# Patient Record
Sex: FEMALE | Race: WHITE | NOT HISPANIC OR LATINO | Employment: FULL TIME | URBAN - METROPOLITAN AREA
[De-identification: names, ages, dates, MRNs, and addresses within clinical notes are randomized per-mention and may not be internally consistent; named-entity substitution may affect disease eponyms.]

---

## 2017-10-11 ENCOUNTER — GENERIC CONVERSION - ENCOUNTER (OUTPATIENT)
Dept: OTHER | Facility: OTHER | Age: 42
End: 2017-10-11

## 2017-10-24 ENCOUNTER — GENERIC CONVERSION - ENCOUNTER (OUTPATIENT)
Dept: OTHER | Facility: OTHER | Age: 42
End: 2017-10-24

## 2017-10-24 DIAGNOSIS — K58.1 IRRITABLE BOWEL SYNDROME WITH CONSTIPATION: ICD-10-CM

## 2017-10-24 DIAGNOSIS — K59.09 OTHER CONSTIPATION: ICD-10-CM

## 2017-11-06 ENCOUNTER — HOSPITAL ENCOUNTER (EMERGENCY)
Facility: HOSPITAL | Age: 42
Discharge: HOME | End: 2017-11-07
Attending: EMERGENCY MEDICINE | Admitting: EMERGENCY MEDICINE
Payer: COMMERCIAL

## 2017-11-06 DIAGNOSIS — S06.0X9A CONCUSSION: ICD-10-CM

## 2017-11-06 DIAGNOSIS — M54.2 NECK PAIN: ICD-10-CM

## 2017-11-06 DIAGNOSIS — S09.90XA MINOR HEAD INJURY, INITIAL ENCOUNTER: Primary | ICD-10-CM

## 2017-11-07 ENCOUNTER — APPOINTMENT (EMERGENCY)
Dept: CT IMAGING | Facility: HOSPITAL | Age: 42
End: 2017-11-07
Payer: COMMERCIAL

## 2017-11-07 VITALS
DIASTOLIC BLOOD PRESSURE: 75 MMHG | HEART RATE: 65 BPM | TEMPERATURE: 97.8 F | WEIGHT: 145 LBS | BODY MASS INDEX: 28.47 KG/M2 | OXYGEN SATURATION: 100 % | HEIGHT: 60 IN | SYSTOLIC BLOOD PRESSURE: 117 MMHG | RESPIRATION RATE: 16 BRPM

## 2017-11-07 PROCEDURE — 72125 CT NECK SPINE W/O DYE: CPT

## 2017-11-07 PROCEDURE — 99284 EMERGENCY DEPT VISIT MOD MDM: CPT

## 2017-11-07 PROCEDURE — 70450 CT HEAD/BRAIN W/O DYE: CPT

## 2017-11-07 RX ORDER — NAPROXEN 250 MG/1
500 TABLET ORAL ONCE
Status: DISCONTINUED | OUTPATIENT
Start: 2017-11-07 | End: 2017-11-07 | Stop reason: HOSPADM

## 2017-11-07 RX ORDER — CYCLOBENZAPRINE HCL 5 MG
5 TABLET ORAL 3 TIMES DAILY PRN
Qty: 21 TABLET | Refills: 0 | Status: SHIPPED | OUTPATIENT
Start: 2017-11-07 | End: 2019-06-22 | Stop reason: ALTCHOICE

## 2017-11-07 RX ORDER — NAPROXEN 500 MG/1
500 TABLET ORAL 2 TIMES DAILY WITH MEALS
Qty: 20 TABLET | Refills: 0 | Status: SHIPPED | OUTPATIENT
Start: 2017-11-07 | End: 2019-06-22 | Stop reason: ALTCHOICE

## 2017-11-07 NOTE — DISCHARGE INSTRUCTIONS
Please return if you developed worsening or other concerning symptoms otherwise follow up as instructed with Neurology for re-evaluation as instructed       Acute Neck Pain   WHAT YOU NEED TO KNOW:   Acute neck pain starts suddenly, increases quickly, and goes away in a few days  The pain may come and go, or be worse with certain movements  The pain may be only in your neck, or it may move to your arms, back, or shoulders  You may also have pain that starts in another body area and moves to your neck  DISCHARGE INSTRUCTIONS:   Return to the emergency department if:   · You have an injury that causes neck pain and shooting pain down your arms or legs  · Your neck pain suddenly becomes severe  · You have neck pain along with numbness, tingling, or weakness in your arms or legs  · You have a stiff neck, a headache, and a fever  Contact your healthcare provider if:   · You have new or worsening symptoms  · Your symptoms continue even after treatment  · You have questions or concerns about your condition or care  Medicines:   · NSAIDs , such as ibuprofen, help decrease swelling, pain, and fever  This medicine is available without a doctor's order  Ask your healthcare provider which medicine to take and how often to take it  Follow directions  NSAIDs can cause stomach bleeding or kidney problems if not taken correctly  If you take blood thinner medicine, always ask if NSAIDs are safe for you  · Acetaminophen  helps decrease pain and fever  Ask your healthcare provider how much to take and how often to take it  Follow directions  Acetaminophen can cause liver damage if not taken correctly  · Steroid medicine  may be used to reduce inflammation  This can help relieve pain caused by swelling  · Take your medicine as directed  Contact your healthcare provider if you think your medicine is not helping or if you have side effects  Tell him or her if you are allergic to any medicine   Keep a list of the medicines, vitamins, and herbs you take  Include the amounts, and when and why you take them  Bring the list or the pill bottles to follow-up visits  Carry your medicine list with you in case of an emergency  Manage or prevent acute neck pain:   · Rest your neck as directed  Do not make sudden movements, such as turning your head quickly  Your healthcare provider may recommend you wear a cervical collar for a short time  The collar will prevent you from moving your head  This will give your neck time to heal if an injury is causing your neck pain  Ask your healthcare provider when you can return to sports or other normal daily activities  · Apply heat as directed  Heat helps relieve pain and swelling  Use a heat wrap, or soak a small towel in warm water  Wring out the extra water  Apply the heat wrap or towel for 20 minutes every hour, or as directed  · Apply ice as directed  Ice helps relieve pain and swelling, and can help prevent tissue damage  Use an ice pack, or put ice in a bag  Cover the ice pack or back with a towel before you apply it to your neck  Apply the ice pack or ice for 15 minutes every hour, or as directed  Your healthcare provider can tell you how often to apply ice  · Do neck exercises as directed  Neck exercises help strengthen the muscles and increase range of motion  Your healthcare provider will tell you which exercises are right for you  He may give you instructions, or he may recommend that you work with a physical therapist  Your healthcare provider or therapist can make sure you are doing the exercises correctly  · Maintain good posture  Try to keep your head and shoulders lifted when you sit  If you work in front of a computer, make sure the monitor is at the right level  You should not need to look up down to see the screen  You should also not have to lean forward to be able to read what is on the screen   Make sure your keyboard, mouse, and other computer items are placed where you do not have to extend your shoulder to reach them  Get up often if you work in front of a computer or sit for long periods of time  Stretch or walk around to keep your neck muscles loose  Follow up with your healthcare provider as directed: Your healthcare provider may refer you to a specialist if your pain does not get better with treatment  Write down your questions so you remember to ask them during your visits  © 2017 [employer] Information is for End User's use only and may not be sold, redistributed or otherwise used for commercial purposes  All illustrations and images included in CareNotes® are the copyrighted property of [employer]  or [address]  The above information is an  only  It is not intended as medical advice for individual conditions or treatments  Talk to your doctor, nurse or pharmacist before following any medical regimen to see if it is safe and effective for you  Concussion   WHAT YOU NEED TO KNOW:   A concussion is a mild brain injury  It is usually caused by a bump or blow to the head from a fall, a motor vehicle crash, or a sports injury  Sometimes being shaken forcefully may cause a concussion  DISCHARGE INSTRUCTIONS:   Have someone else call 911 for the following:   Someone tries to wake you and cannot do so  You have a seizure, increasing confusion, or a change in personality  Your speech becomes slurred, or you have new vision problems  Return to the emergency department if:   You have a severe headache that does not go away  You have arm or leg weakness, numbness, or new problems with coordination  You have blood or clear fluid coming out of the ears or nose  Contact your healthcare provider if:   You have nausea or are vomiting  You feel more sleepy than usual     Your symptoms get worse  Your symptoms last longer than 6 weeks after the injury      You have questions or concerns about your condition or care  Medicines:   Acetaminophen  helps to decrease pain  It is available without a doctor's order  Ask how much to take and how often to take it  Follow directions  Acetaminophen can cause liver damage if not taken correctly  NSAIDs , such as ibuprofen, help decrease swelling and pain  NSAIDs can cause stomach bleeding or kidney problems in certain people  If you take blood thinner medicine, always ask your healthcare provider if NSAIDs are safe for you  Always read the medicine label and follow directions  Take your medicine as directed  Contact your healthcare provider if you think your medicine is not helping or if you have side effects  Tell him or her if you are allergic to any medicine  Keep a list of the medicines, vitamins, and herbs you take  Include the amounts, and when and why you take them  Bring the list or the pill bottles to follow-up visits  Carry your medicine list with you in case of an emergency  Follow up with your healthcare provider as directed:  Write down your questions so you remember to ask them during your visits  Self-care:   Rest  from physical and mental activities as directed  Mental activities are those that require thinking, concentration, and attention  You will need to rest until your symptoms are gone  Rest will allow you to recover from your concussion  Ask your healthcare provider when you can return to work and other daily activities  Have someone stay with you for the first 24 hours after your injury  Your healthcare provider should be contacted if your symptoms get worse, or you develop new symptoms  Do not participate in sports and physical activities until your healthcare provider says it is okay  They could make your symptoms worse or lead to another concussion  Your healthcare provider will tell you when it is okay for you to return to sports or physical activities    Prevent another concussion:   Wear protective sports equipment that fit properly  Helmets help decrease your risk of a serious brain injury  Talk to your healthcare provider about ways you can decrease your risk for a concussion if you play sports  Wear your seat belt  every time you travel  This helps to decrease your risk of a head injury if you are in a car accident  © 2017 [address] [employer] Information is for End User's use only and may not be sold, redistributed or otherwise used for commercial purposes  All illustrations and images included in CareNotes® are the copyrighted property of [employer]  or [employer]  The above information is an  only  It is not intended as medical advice for individual conditions or treatments  Talk to your doctor, nurse or pharmacist before following any medical regimen to see if it is safe and effective for you

## 2017-11-07 NOTE — ED PROVIDER NOTES
History  Chief Complaint   Patient presents with   [facility] Theresa Fall     Per pt - fall on Saturday, pt struck back on head on concrete after roller skating  Pt denies LOC, reports vision changes, disorientation, and headache     44-year-old female without past medical history presenting with chief complaint of head injury  Patient reports that on Friday she was at a skating rink she was struck by another skater by accident and she had legs taken out from underneath her and she struck the back of her head without catching herself she states there is a 20 minutes marleen of where she does not remember at all feels blurry although she remembers that she was able to get up off the ground after several minutes and go to an area where she sat for approximately 20 minutes with a severe posterior headache, her headache is still described as posterior radiates frontal no exacerbating or remitting factors over the last couple days she has noticed that she is dull she has intermittent blurry vision she does not feel sharp as she usually does, she feels forgetful and has difficulty with her concentration she also notes posterior neck pain she was seen at a pharmacy and directed to go the emergency department for evaluation of head injury and possible neck injury  Patient otherwise denies other auditory visual or balance complaint denies anterior neck pain weakness paresthesias or anesthesia nausea vomiting chest pain cough shortness of breath anorexia abdominal pain other muscle skeletal complaint or injury  Complete review systems otherwise negative  Patient is status post hysterectomy            None       Past Medical History:   Diagnosis Date    SVT (supraventricular tachycardia) ([facility] [address] )        Past Surgical History:   Procedure Laterality Date    HYSTERECTOMY         History reviewed  No pertinent family history  I have reviewed and agree with the history as documented      Social History   Substance Use Topics    Smoking status: Never Smoker    Smokeless tobacco: Never Used    Alcohol use No        Review of Systems   Constitutional: Negative for chills and fever  HENT: Negative for rhinorrhea and sore throat  Eyes: Positive for visual disturbance  Negative for photophobia, pain and redness  Respiratory: Negative for cough and shortness of breath  Cardiovascular: Negative for chest pain and palpitations  Gastrointestinal: Negative for abdominal pain, diarrhea, nausea and vomiting  Genitourinary: Negative for dysuria, frequency and urgency  Musculoskeletal: Positive for neck pain and neck stiffness  Negative for arthralgias, back pain and myalgias  Skin: Negative for color change and rash  Neurological: Positive for headaches  Negative for dizziness, facial asymmetry, weakness and numbness  Hematological: Negative for adenopathy  Does not bruise/bleed easily  Psychiatric/Behavioral: Negative for agitation and behavioral problems  All other systems reviewed and are negative  Physical Exam  ED Triage Vitals   Temperature Pulse Respirations Blood Pressure SpO2   11/06/17 2152 11/06/17 2304 11/06/17 2152 11/06/17 2152 11/06/17 2152   97 8 °F (36 6 °C) 79 16 134/79 100 %      Temp Source Heart Rate Source Patient Position - Orthostatic VS BP Location FiO2 (%)   11/06/17 2152 11/06/17 2152 11/06/17 2152 11/06/17 2152 --   Oral Monitor Sitting Left arm       Pain Score       11/06/17 2304       1           Orthostatic Vital Signs  Vitals:    11/06/17 2152 11/06/17 2304 11/07/17 0111   BP: 134/79 138/79 117/75   Pulse:  79 65   Patient Position - Orthostatic VS: Sitting Sitting Sitting       Physical Exam   Constitutional: She is oriented to person, place, and time  She appears well-developed and well-nourished  No distress  Well-appearing conversational no acute distress   HENT:   Head: Normocephalic and atraumatic     Right Ear: External ear normal    Left Ear: External ear normal    Mouth/Throat: Oropharynx is clear and moist    Patient has some mild tenderness over posterior scalp although her head appears atraumatic   Eyes: EOM are normal  Pupils are equal, round, and reactive to light  Pupils are 3 mm equal reactive bilaterally extraocular motions are intact without pain there is no chemosis conjunctival injection there are no other acute findings on eye exam   Neck: Normal range of motion  Neck supple  No tracheal deviation present  Cardiovascular: Normal rate, regular rhythm and normal heart sounds  Exam reveals no gallop and no friction rub  No murmur heard  Pulmonary/Chest: Effort normal and breath sounds normal  She has no wheezes  She has no rales  Abdominal: Soft  Bowel sounds are normal  She exhibits no distension  There is no tenderness  There is no rebound and no guarding  Musculoskeletal: Normal range of motion  She exhibits no edema or tenderness  Patient is 5/5 strength in the upper extremities lower extremities globally sensation is intact cap refills less than 2 seconds   Neurological: She is alert and oriented to person, place, and time  No cranial nerve deficit  She exhibits normal muscle tone  Coordination normal    Patient was observed having normal gait she has finger to nose intact pronator drift intact dysdiadochokinesis intact visual fields are grossly intact vision is grossly intact cranial nerves are intact muscle strength is 5/5 globally she does have mild midline upper cervical tenderness again her head appears atraumatic no other injuries noted to the head neck or face   Skin: Skin is warm and dry  No rash noted  Psychiatric: She has a normal mood and affect  Her behavior is normal    Nursing note and vitals reviewed        ED Medications  Medications - No data to display    Diagnostic Studies  Results Reviewed     None                 RADIOLOGY RESULTS   Final Result by  (11/07 1130)      CT spine cervical without contrast   ED Interpretation by Spring Serrano DO (11/07 200)   Vrad- no acute abnormality      Final Result by Kody Weaver MD (11/07 0406)      No cervical spine fracture or prevertebral soft tissue swelling  Straightening of the normal lordosis  Mild multilevel degenerative disease  Findings are consistent with the preliminary report from [facility] which was provided shortly after completion of the exam                       Workstation performed: [id number]         CT head without contrast   ED Interpretation by Boy Springer DO (11/07 0046)   Vrad- no acute abnormality      Final Result by Kody Weaver MD (11/07 0359)      No intracranial hemorrhage or calvarial fracture        Findings are consistent with the preliminary report from [facility] which was provided shortly after completion of the exam                       Workstation performed: [id number]                    Procedures  Procedures       Phone Contacts  ED Phone Contact    ED Course  ED Course as of Nov 07 1626 Tue Nov 07, 2017   0105 Patient seen in the Cut off waited, her CT of her head and neck were normal will discharge with Neurology follow-up versus Sports Medicine follow-up, agreeable to plan comma close return instructions                                MDM  Number of Diagnoses or Management Options  Concussion:   Minor head injury, initial encounter:   Neck pain:   Diagnosis management comments: 46-year-old female without past medical history presenting several days after which she reports to be severe head injury where she had her feet taken out from underneath her, unsure of loss of consciousness but felt days for least 20 minutes with severe posterior headache also had neck pain, since that time she is felt foggy, she has intermittent blurry vision difficulty concentrating ongoing posterior head discomfort posterior neck pain without other neurologic complaints, on exam she is afebrile normal vital signs she is clinically very well-appearing conversational or head appears atraumatic that she does have some mild midline cervical tenderness without step-offs or deformity the upper lower extremities are neurovascularly intact, she is otherwise neurologically intact including eye exam, this most likely represents post concussive symptoms is not consistent with eye pathology or central cerebral vascular pathology including vertebral or carotid dissection, however patient expressed concern about possible intracranial hemorrhage, and was sent in by pharmacy for evaluation, will get CT scan of the head and neck if she develop severe headache with some vague neurologic symptoms, unknown loss of consciousness, CT scan of her neck with midline posterior tenderness, if negative will treat symptomatically with close return in follow-up instructions, expected management, neurology follow-up    CritCare Time    Disposition  Final diagnoses:   Minor head injury, initial encounter   Concussion   Neck pain     Time reflects when diagnosis was documented in both MDM as applicable and the Disposition within this note     Time User Action Codes Description Comment    11/7/2017  1:05 AM Keo Peasant Add [S00 90XA] Minor head injury, initial encounter     11/7/2017  1:06 AM Keo Peasant Add [S06 0X9A] Concussion     11/7/2017  1:06 AM Keo Peasant Add [M54 2] Neck pain       ED Disposition     ED Disposition Condition Comment    Discharge  Ofelia Blanchard discharge to home/self care      Condition at discharge: Good        Follow-up Information     Follow up With Specialties Details Why Contact Info Additional Information    [facility] Emergency Department Emergency Medicine  If symptoms worsen [address] [facility] [place] [id number] ED, [address] [place], [place], [address]    [facility] In 1 week  [address] [facility] [address] [phone] Discharge Medication List as of 11/7/2017  1:08 AM      START taking these medications    Details   cyclobenzaprine (FLEXERIL) 5 mg tablet Take 1 tablet by mouth 3 (three) times a day as needed for muscle spasms for up to 7 days, Starting Tue 11/7/2017, Until Tue 11/14/2017, Print      naproxen (NAPROSYN) 500 mg tablet Take 1 tablet by mouth 2 (two) times a day with meals for 10 days, Starting Tue 11/7/2017, Until Fri 11/17/2017, Print           No discharge procedures on file      ED Provider  Electronically Signed by           Júnior Garza DO  11/07/17 3241

## 2018-01-22 VITALS
SYSTOLIC BLOOD PRESSURE: 130 MMHG | DIASTOLIC BLOOD PRESSURE: 80 MMHG | HEIGHT: 61 IN | BODY MASS INDEX: 28.89 KG/M2 | WEIGHT: 153 LBS

## 2018-01-22 VITALS
BODY MASS INDEX: 27.38 KG/M2 | DIASTOLIC BLOOD PRESSURE: 70 MMHG | HEIGHT: 61 IN | SYSTOLIC BLOOD PRESSURE: 116 MMHG | WEIGHT: 145 LBS

## 2019-05-02 ENCOUNTER — TELEPHONE (OUTPATIENT)
Dept: GASTROENTEROLOGY | Facility: CLINIC | Age: 44
End: 2019-05-02

## 2019-05-02 PROBLEM — Z86.0100 HISTORY OF COLON POLYPS: Status: ACTIVE | Noted: 2019-05-02

## 2019-05-02 PROBLEM — Z86.010 HISTORY OF COLON POLYPS: Status: ACTIVE | Noted: 2019-05-02

## 2019-06-22 ENCOUNTER — HOSPITAL ENCOUNTER (OUTPATIENT)
Dept: GASTROENTEROLOGY | Facility: HOSPITAL | Age: 44
Setting detail: OUTPATIENT SURGERY
Discharge: HOME | End: 2019-06-22
Attending: INTERNAL MEDICINE
Payer: COMMERCIAL

## 2019-06-22 ENCOUNTER — ANESTHESIA (OUTPATIENT)
Dept: GASTROENTEROLOGY | Facility: HOSPITAL | Age: 44
End: 2019-06-22

## 2019-06-22 ENCOUNTER — ANESTHESIA EVENT (OUTPATIENT)
Dept: GASTROENTEROLOGY | Facility: HOSPITAL | Age: 44
End: 2019-06-22

## 2019-06-22 VITALS
TEMPERATURE: 98.3 F | DIASTOLIC BLOOD PRESSURE: 67 MMHG | BODY MASS INDEX: 26.22 KG/M2 | SYSTOLIC BLOOD PRESSURE: 102 MMHG | WEIGHT: 138.89 LBS | OXYGEN SATURATION: 100 % | RESPIRATION RATE: 18 BRPM | HEART RATE: 67 BPM | HEIGHT: 61 IN

## 2019-06-22 DIAGNOSIS — Z86.010 HISTORY OF COLONIC POLYPS: ICD-10-CM

## 2019-06-22 PROCEDURE — 88305 TISSUE EXAM BY PATHOLOGIST: CPT | Performed by: PATHOLOGY

## 2019-06-22 PROCEDURE — 45384 COLONOSCOPY W/LESION REMOVAL: CPT | Performed by: INTERNAL MEDICINE

## 2019-06-22 RX ORDER — PROPOFOL 10 MG/ML
INJECTION, EMULSION INTRAVENOUS AS NEEDED
Status: DISCONTINUED | OUTPATIENT
Start: 2019-06-22 | End: 2019-06-22 | Stop reason: SURG

## 2019-06-22 RX ORDER — LORATADINE 10 MG/1
10 TABLET ORAL DAILY
COMMUNITY

## 2019-06-22 RX ORDER — SODIUM CHLORIDE, SODIUM LACTATE, POTASSIUM CHLORIDE, CALCIUM CHLORIDE 600; 310; 30; 20 MG/100ML; MG/100ML; MG/100ML; MG/100ML
125 INJECTION, SOLUTION INTRAVENOUS CONTINUOUS
Status: DISCONTINUED | OUTPATIENT
Start: 2019-06-22 | End: 2019-06-26 | Stop reason: HOSPADM

## 2019-06-22 RX ORDER — LIDOCAINE HYDROCHLORIDE 10 MG/ML
INJECTION, SOLUTION INFILTRATION; PERINEURAL AS NEEDED
Status: DISCONTINUED | OUTPATIENT
Start: 2019-06-22 | End: 2019-06-22 | Stop reason: SURG

## 2019-06-22 RX ORDER — DULOXETIN HYDROCHLORIDE 20 MG/1
20 CAPSULE, DELAYED RELEASE ORAL DAILY
COMMUNITY

## 2019-06-22 RX ADMIN — PROPOFOL 50 MG: 10 INJECTION, EMULSION INTRAVENOUS at 09:44

## 2019-06-22 RX ADMIN — SODIUM CHLORIDE, SODIUM LACTATE, POTASSIUM CHLORIDE, AND CALCIUM CHLORIDE 125 ML/HR: .6; .31; .03; .02 INJECTION, SOLUTION INTRAVENOUS at 09:05

## 2019-06-22 RX ADMIN — LIDOCAINE HYDROCHLORIDE 50 MG: 10 INJECTION, SOLUTION INFILTRATION; PERINEURAL at 09:39

## 2019-06-22 RX ADMIN — PROPOFOL 50 MG: 10 INJECTION, EMULSION INTRAVENOUS at 09:48

## 2019-06-22 RX ADMIN — PROPOFOL 150 MG: 10 INJECTION, EMULSION INTRAVENOUS at 09:39

## 2021-01-09 ENCOUNTER — NURSE TRIAGE (OUTPATIENT)
Dept: OTHER | Facility: OTHER | Age: 46
End: 2021-01-09

## 2021-01-09 DIAGNOSIS — R05.9 COUGH: Primary | ICD-10-CM

## 2021-01-10 DIAGNOSIS — R05.9 COUGH: ICD-10-CM

## 2021-01-10 PROCEDURE — U0003 INFECTIOUS AGENT DETECTION BY NUCLEIC ACID (DNA OR RNA); SEVERE ACUTE RESPIRATORY SYNDROME CORONAVIRUS 2 (SARS-COV-2) (CORONAVIRUS DISEASE [COVID-19]), AMPLIFIED PROBE TECHNIQUE, MAKING USE OF HIGH THROUGHPUT TECHNOLOGIES AS DESCRIBED BY CMS-2020-01-R: HCPCS | Performed by: FAMILY MEDICINE

## 2021-01-10 PROCEDURE — U0005 INFEC AGEN DETEC AMPLI PROBE: HCPCS | Performed by: FAMILY MEDICINE

## 2021-01-10 NOTE — TELEPHONE ENCOUNTER
Regarding: COVID/ Symptomatic/ 2 of 2  ----- Message from K121 sent at 1/9/2021 12:51 PM EST -----  Pt states: ""I am calling on behalf of my son and I  My  was admitted to the hospital and while we are still waiting for his results, we are exhibiting symptoms  I have a sinus headache, runny nose, and a post nasal drip feeling  My son has a runny nose, fever, body aches, sore throat, no taste, and a bad headache   We are looking to get tested "

## 2021-01-10 NOTE — TELEPHONE ENCOUNTER
Reason for Disposition   [1] COVID-19 infection suspected by caller or triager AND [2] mild symptoms (cough, fever, or others) AND [5] no complications or SOB    Answer Assessment - Initial Assessment Questions  1  COVID-19 DIAGNOSIS: "Who made your Coronavirus (COVID-19) diagnosis?" "Was it confirmed by a positive lab test?" If not diagnosed by a HCP, ask "Are there lots of cases (community spread) where you live?" (See public health department website, if unsure)      n/a  2  COVID-19 EXPOSURE: "Was there any known exposure to [place] before the symptoms began?" [facility] Definition of close contact: within 6 feet (2 meters) for a total of 15 minutes or more over a 24-hour period  Yes,  has Covid with pneumonia  3  ONSET: "When did the COVID-19 symptoms start?"       Started 2 days ago  4  WORST SYMPTOM: "What is your worst symptom?" (e g , cough, fever, shortness of breath, muscle aches)      Post nasal drip/cough  5  COUGH: "Do you have a cough?" If so, ask: "How bad is the cough?"        Slight cough  6  FEVER: "Do you have a fever?" If so, ask: "What is your temperature, how was it measured, and when did it start?"      No fever  7  RESPIRATORY STATUS: "Describe your breathing?" (e g , shortness of breath, wheezing, unable to speak)       No problems  8  BETTER-SAME-WORSE: "Are you getting better, staying the same or getting worse compared to yesterday?"  If getting worse, ask, "In what way?"      Getting worse  9  HIGH RISK DISEASE: "Do you have any chronic medical problems?" (e g , asthma, heart or lung disease, weak immune system, obesity, etc )      no  10  PREGNANCY: "Is there any chance you are pregnant?" "When was your last menstrual period?"       Had partial hysterectomy  11   OTHER SYMPTOMS: "Do you have any other symptoms?"  (e g , chills, fatigue, headache, loss of smell or taste, muscle pain, sore throat; new loss of smell or taste especially support the diagnosis of COVID-19) Headache, runny nose    Protocols used: CORONAVIRUS (COVID-19) DIAGNOSED OR SUSPECTED-ADULT-

## 2021-01-10 NOTE — TELEPHONE ENCOUNTER
Pt has cough, runny nose and states  has Covid and pneumonia  Order placed, pt will go to [facility] in [address] tomorrow for swab  Advice offered per protocol

## 2021-01-12 ENCOUNTER — TELEPHONE (OUTPATIENT)
Dept: BARIATRICS | Facility: CLINIC | Age: 46
End: 2021-01-12

## 2021-01-12 LAB — SARS-COV-2 RNA SPEC QL NAA+PROBE: DETECTED

## 2021-01-12 NOTE — TELEPHONE ENCOUNTER
Pt made aware Your test for the novel coronavirus, also known as COVID-19, was positive  The sample showed that the virus was present  Positive COVID-19 test results are reportable to the [place] Department of Health  You may receive a call from trained public health staff to conduct an interview  It is important to answer their call  They will ask you to verify who you are  During the call they will ask you about what symptoms you have, what you did before you got sick, and who you were close to while sick  The health department does this to make sure everyone stays healthy and to reduce the spread of the virus  If you would like to verify if the caller does in fact work in contact tracing, call the [address] [place] [address] at [employer] ([phone])  For additional information, please visit the [employer]  website: www health [place] gov     If you have any additional questions, we can schedule a virtual visit for you with a provider or call the [facility] hotline [phone], option 7, for care advice    For additional information, please visit the Coronavirus FAQ on the [facility] home page ([employer]  org)

## 2022-05-09 ENCOUNTER — TELEPHONE (OUTPATIENT)
Dept: OTHER | Facility: OTHER | Age: 47
End: 2022-05-09

## 2022-05-09 NOTE — TELEPHONE ENCOUNTER
Dr Lesa Lawrence calling to leave a message with NANCY COVINGTON, stated that the patient had an implant, pt has an appt with someone on wednesday and needs to speak with office prior to that and stated he is not in on wednesdays and would like a call back tomorrow  Please advise

## 2023-02-04 ENCOUNTER — NURSE TRIAGE (OUTPATIENT)
Dept: OTHER | Facility: OTHER | Age: 48
End: 2023-02-04

## 2023-02-04 NOTE — TELEPHONE ENCOUNTER
TT message sent to Dr Merline Boo [birth year] [phone]  Implant Wed 2/1 by Dr Cassandra Helms  C/o 8/10 pain -tooth anterior to implant  Gum tissue very red/swollen  Moderate facial swelling  Doing salt rinses  No pain meds or abx prescribed  Pain unbearable at 0300, took old codeine pill  Motrin 800mg & Tylenol 1000mg at 0830   This is her 4th implant, "It's never hurt like this before " [facility] - [phone]

## 2023-02-04 NOTE — TELEPHONE ENCOUNTER
Dr Hilaria Dunlap placed dental implant - bottom left on Wednesday  No abx or pain meds prescribed  Pain became very intense last night  Motrin 800mg and Tylenol 1000mg 0830 today  This am pt states pain is 8/10  Rinsing with salt water  Facial swelling is present Surrounding gum tissue is swollen, red, and painful  Tooth anterior to implant is painful       Reason for Disposition  • [1] SEVERE pain (e g , excruciating, unable to do any normal activities) AND [2] not improved 2 hours after pain medicine    Answer Assessment - Initial Assessment Questions  See note    Protocols used: [id number]

## 2023-02-04 NOTE — TELEPHONE ENCOUNTER
Regarding: Post Tooth Implant Pain, Throbbing, OMS  ----- Message from Fernando Madison sent at 2023  8:33 AM EST -----  " I Had an Implant on Wednesday, I'm not supposed to have too much Pain from an Implant but on Friday night the Pain Intense, the tooth next to the Implant is throbbing   I am concerned that it might be Infected "

## 2023-02-04 NOTE — TELEPHONE ENCOUNTER
Call to patient to see if Dr Tawana Walker called her  Pt has not received call from Dr Tawana Walker  Will repage info to MD now

## 2024-01-19 ENCOUNTER — HOSPITAL ENCOUNTER (OUTPATIENT)
Dept: MRI IMAGING | Facility: HOSPITAL | Age: 49
End: 2024-01-19
Payer: COMMERCIAL

## 2024-01-19 DIAGNOSIS — M46.42 DISCITIS, UNSPECIFIED, CERVICAL REGION: ICD-10-CM

## 2024-01-19 PROCEDURE — 72141 MRI NECK SPINE W/O DYE: CPT

## 2024-03-28 ENCOUNTER — OFFICE VISIT (OUTPATIENT)
Dept: NEUROSURGERY | Facility: CLINIC | Age: 49
End: 2024-03-28
Payer: COMMERCIAL

## 2024-03-28 VITALS
SYSTOLIC BLOOD PRESSURE: 118 MMHG | RESPIRATION RATE: 18 BRPM | BODY MASS INDEX: 25.11 KG/M2 | HEIGHT: 61 IN | HEART RATE: 89 BPM | DIASTOLIC BLOOD PRESSURE: 72 MMHG | TEMPERATURE: 97.9 F | OXYGEN SATURATION: 99 % | WEIGHT: 133 LBS

## 2024-03-28 DIAGNOSIS — M54.2 CERVICALGIA: Primary | ICD-10-CM

## 2024-03-28 DIAGNOSIS — M47.22 CERVICAL SPONDYLOSIS WITH RADICULOPATHY: ICD-10-CM

## 2024-03-28 DIAGNOSIS — M54.12 RADICULOPATHY, CERVICAL: ICD-10-CM

## 2024-03-28 PROCEDURE — 99204 OFFICE O/P NEW MOD 45 MIN: CPT | Performed by: NEUROLOGICAL SURGERY
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
## 2024-03-29 ENCOUNTER — APPOINTMENT (OUTPATIENT)
Dept: RADIOLOGY | Facility: CLINIC | Age: 49
End: 2024-03-29
Payer: COMMERCIAL

## 2024-03-29 DIAGNOSIS — M54.2 CERVICALGIA: ICD-10-CM

## 2024-03-29 DIAGNOSIS — M54.12 RADICULOPATHY, CERVICAL: ICD-10-CM

## 2024-03-29 DIAGNOSIS — M47.22 CERVICAL SPONDYLOSIS WITH RADICULOPATHY: ICD-10-CM

## 2024-03-29 PROCEDURE — 72052 X-RAY EXAM NECK SPINE 6/>VWS: CPT

## 2024-04-02 ENCOUNTER — TELEPHONE (OUTPATIENT)
Dept: NEUROSURGERY | Facility: CLINIC | Age: 49
End: 2024-04-02

## 2024-04-02 NOTE — TELEPHONE ENCOUNTER
Received a call from Jenny reporting she was instructed to call the office after she had her xray completed.     Results appear to be released. Will route a message to provider and assist as needed.

## 2024-04-04 ENCOUNTER — EVALUATION (OUTPATIENT)
Dept: PHYSICAL THERAPY | Facility: CLINIC | Age: 49
End: 2024-04-04
Payer: COMMERCIAL

## 2024-04-04 DIAGNOSIS — M54.12 RADICULOPATHY, CERVICAL: ICD-10-CM

## 2024-04-04 DIAGNOSIS — M54.2 CERVICALGIA: Primary | ICD-10-CM

## 2024-04-04 DIAGNOSIS — M47.22 CERVICAL SPONDYLOSIS WITH RADICULOPATHY: ICD-10-CM

## 2024-04-04 PROCEDURE — 97140 MANUAL THERAPY 1/> REGIONS: CPT

## 2024-04-04 PROCEDURE — 97161 PT EVAL LOW COMPLEX 20 MIN: CPT

## 2024-04-04 PROCEDURE — 97112 NEUROMUSCULAR REEDUCATION: CPT

## 2024-04-04 NOTE — PROGRESS NOTES
PT Evaluation     Today's date: 2024  Patient name: Jenny Sepulveda  : 1975  MRN: [id number]  Referring provider: Endy Aggarwal,*  Dx:   Encounter Diagnosis     ICD-10-CM    1. Cervicalgia  M54.2 Ambulatory referral to Physical Therapy      2. Radiculopathy, cervical  M54.12 Ambulatory referral to Physical Therapy      3. Cervical spondylosis with radiculopathy  M47.22 Ambulatory referral to Physical Therapy          Start Time: 1545  Stop Time: 1630  Total time in clinic (min): 45 minutes    Assessment  Assessment details: Pt is a 49 y/o presenting to outpatient PT with a diagnosis of cervicalgia and cervical radiculopathy. Upon examination, pt presents with decreased cervical AROM, decreased cervical strength, tenderness throughout cervical musculature, and increased neural tension based on (+) ULTT. Functionally, these impairments have led to difficulty with prolonged sitting, performing her usual household chores, and performing her usual work activities as a . Based on above findings, pt will benefit from skilled PT services to improve functional limitations in order to improve activity tolerance for ADLs and work activities.   Impairments: abnormal or restricted ROM, impaired physical strength, pain with function and poor posture   Functional limitations: prolonged sitting, perofrming work duties as secretaryBarriers to therapy: None  Understanding of Dx/Px/POC: good   Prognosis: good    Goals  STG - 4 weeks  1. Cervical L rotation AROM improved to at least 70 with minimal to no symptoms  2. Pain at worst < 6/10  LTG - 8 weeks  1. Pt will improve FOTO score by 10 points to demonstrate improved functional abilities.  2. Pt able to tolerate full work day without symptom provocation.     Plan  Patient would benefit from: PT eval and skilled physical therapy  Planned modality interventions: cryotherapy, thermotherapy: hydrocollator packs and unattended electrical stimulation  Planned  therapy interventions: IASTM, joint mobilization, kinesiology taping, manual therapy, neuromuscular re-education, nerve gliding, therapeutic activities, strengthening, stretching, therapeutic exercise and body mechanics training  Frequency: 2x week  Duration in weeks: 8  Plan of Care beginning date: 2024  Plan of Care expiration date: 2024  Treatment plan discussed with: patient        Subjective Evaluation    History of Present Illness  Date of onset: 2024  Mechanism of injury: Pt current complaint is neck pain mostly on the left side. She thinks she has been dealing with it for a while but thought it was her shoulder up until recently. She notes it feels like a spasm in her neck but gets burning, numbness, and tingling into both of her arms. She was previously seeing chiropractic care and got injections but neither of which relieved symptoms.   Quality of life: good    Patient Goals  Patient goals for therapy: decreased pain, increased motion and increased strength    Pain  Current pain ratin  At best pain rating: 3  At worst pain ratin  Location: Cervical  Quality: burning, dull ache and sharp  Alleviating factors: none.  Aggravating factors: lifting  Progression: worsening    Treatments  Previous treatment: chiropractic  Current treatment: injection treatment        Objective     Concurrent Complaints  Negative for night pain and disturbed sleep    Palpation   Left   Muscle spasm in the upper trapezius.   Tenderness of the upper trapezius.     Right   Tenderness of the upper trapezius.     Neurological Testing     Sensation   Cervical/Thoracic   Left   Intact: light touch    Right   Intact: light touch    Active Range of Motion   Cervical/Thoracic Spine       Cervical    Flexion: 35 degrees   Extension: 45 degrees      Left lateral flexion: 25 degrees      Right lateral flexion: 35 degrees      Left rotation: 50 degrees  Right rotation: 75 degrees       Strength/Myotome Testing  "    Additional Strength Details  UE strength assessment WNL, left slightly weaker than right    Tests   Cervical   Positive neck flexor muscle endurance test.  Negative repeated extension and repeated flexion.     Left   Negative Spurling's Test A and Spurling's Test B.     Right   Negative Spurling's Test A and Spurling's Test B.     Left Shoulder   Positive ULTT2.     Right Shoulder   Positive ULTT1.              Precautions: None    POC expires Unit limit Auth Expiration date PT/OT + Visit Limit?   5/30/24 N/A N/A BOMN                 Visit/Unit Tracking  AUTH Status:  Date 4/4              N/A Used 1               Remaining                     FOTO        Manuals 4/4            SOR 5'            STM B UT 5'            Median NG B 3'                         Neuro Re-Ed             Pt education 10'            Chin tuck supine 15x5\"                                                                             Ther Ex                                                                                                                     Ther Activity                                       Gait Training                                       Modalities                                            "

## 2024-04-09 ENCOUNTER — OFFICE VISIT (OUTPATIENT)
Dept: PHYSICAL THERAPY | Facility: CLINIC | Age: 49
End: 2024-04-09
Payer: COMMERCIAL

## 2024-04-09 DIAGNOSIS — M54.2 CERVICALGIA: Primary | ICD-10-CM

## 2024-04-09 DIAGNOSIS — M54.12 RADICULOPATHY, CERVICAL: ICD-10-CM

## 2024-04-09 DIAGNOSIS — M47.22 CERVICAL SPONDYLOSIS WITH RADICULOPATHY: ICD-10-CM

## 2024-04-09 PROCEDURE — 97112 NEUROMUSCULAR REEDUCATION: CPT

## 2024-04-09 PROCEDURE — 97140 MANUAL THERAPY 1/> REGIONS: CPT

## 2024-04-09 PROCEDURE — 97110 THERAPEUTIC EXERCISES: CPT

## 2024-04-09 NOTE — PROGRESS NOTES
"Daily Note     Today's date: 2024  Patient name: Jenny Sepulveda  : 1975  MRN: [id number]  Referring provider: Endy Aggarwal,*  Dx:   Encounter Diagnosis     ICD-10-CM    1. Cervicalgia  M54.2       2. Radiculopathy, cervical  M54.12       3. Cervical spondylosis with radiculopathy  M47.22           Start Time: 1545  Stop Time: 1625  Total time in clinic (min): 40 minutes    Subjective: Pt reports her neck is in spasm today and is in a lot of pain.       Objective: See treatment diary below      Assessment: Tolerated treatment well. Patient demonstrated fatigue post treatment, exhibited good technique with therapeutic exercises, and would benefit from continued PT. The patient is provided with cuing and demonstration with initiation of program to ensure proper form and technique with exercises. She responds well to manual interventions with noted decrease in muscle spasm and improved mobility at end of session. Plan to progress program base don pt's tolerance next session.       Plan: Continue per plan of care.      Precautions: None    POC expires Unit limit Auth Expiration date PT/OT + Visit Limit?   24 N/A N/A BOMN                 Visit/Unit Tracking  AUTH Status:  Date              N/A Used 1 2              Remaining                     FOTO        Manuals            SOR 5' CG           STM B UT 5' CG           Median NG B 3'            L rot CPT  CG           Neuro Re-Ed             Pt education 10' 5'           Chin tuck seated 15x5\" 15x5\"           Seated median NG  15x ea           Seated no monies  Red x20           TB mid/low rows  Grn 2x10 ea                                     Ther Ex             T/S ext over 1/2 roll  15x3\"           T/S rot over 1/2 roll  15x3\"           CS L rot SNAG  15x3\"                                                                            Ther Activity                                       Gait Training                                     "   Modalities

## 2024-04-11 ENCOUNTER — OFFICE VISIT (OUTPATIENT)
Dept: PHYSICAL THERAPY | Facility: CLINIC | Age: 49
End: 2024-04-11
Payer: COMMERCIAL

## 2024-04-11 DIAGNOSIS — M47.22 CERVICAL SPONDYLOSIS WITH RADICULOPATHY: ICD-10-CM

## 2024-04-11 DIAGNOSIS — M54.2 CERVICALGIA: Primary | ICD-10-CM

## 2024-04-11 DIAGNOSIS — M54.12 RADICULOPATHY, CERVICAL: ICD-10-CM

## 2024-04-11 PROCEDURE — 97112 NEUROMUSCULAR REEDUCATION: CPT

## 2024-04-11 PROCEDURE — 97110 THERAPEUTIC EXERCISES: CPT

## 2024-04-11 PROCEDURE — 97140 MANUAL THERAPY 1/> REGIONS: CPT

## 2024-04-11 NOTE — PROGRESS NOTES
"Daily Note     Today's date: 2024  Patient name: Jenny Sepulveda  : 1975  MRN: [id number]  Referring provider: Endy Aggarwal,*  Dx:   Encounter Diagnosis     ICD-10-CM    1. Cervicalgia  M54.2       2. Radiculopathy, cervical  M54.12       3. Cervical spondylosis with radiculopathy  M47.22                      Subjective: Pt reports she was sore following last session and has a headache today.      Objective: See treatment diary below      Assessment: Tolerated treatment well. Patient demonstrated fatigue post treatment, exhibited good technique with therapeutic exercises, and would benefit from continued PT. Pt responds well to manual interventions this session with noted improvements in headache symptoms following. Verbal cues provided throughout tx session to ensure proper form and technique with exercises.       Plan: Continue per plan of care.      Precautions: None    POC expires Unit limit Auth Expiration date PT/OT + Visit Limit?   24 N/A N/A BOMN                 Visit/Unit Tracking  AUTH Status:  Date             N/A Used 1 2 3             Remaining                     FOTO        Manuals           SOR 5' CG CG          STM B UT 5' CG CG          Median NG B 3'            L rot CPT  CG           Manual traction   CG          Supine OA hold relax   LOIS          Neuro Re-Ed             Pt education 10' 5'           Chin tuck seated 15x5\" 15x5\" 15x5\"          Seated median NG  15x ea X15 ea          Seated no monies  Red x20 Red x20          TB mid/low rows  Grn 2x10 ea Grn 2x10 ea          Supine D2 flex   Red x15 ea                       Ther Ex             T/S ext over 1/2 roll  15x3\" 15x3\"          T/S rot over 1/2 roll  15x3\" 15x3\"          CS L rot SNAG  15x3\" 15x3\"                                                                           Ther Activity                                       Gait Training                                       Modalities        "

## 2024-04-12 ENCOUNTER — TELEPHONE (OUTPATIENT)
Dept: NEUROSURGERY | Facility: CLINIC | Age: 49
End: 2024-04-12

## 2024-04-15 ENCOUNTER — OFFICE VISIT (OUTPATIENT)
Dept: PHYSICAL THERAPY | Facility: CLINIC | Age: 49
End: 2024-04-15
Payer: COMMERCIAL

## 2024-04-15 DIAGNOSIS — M47.22 CERVICAL SPONDYLOSIS WITH RADICULOPATHY: ICD-10-CM

## 2024-04-15 DIAGNOSIS — M54.12 RADICULOPATHY, CERVICAL: ICD-10-CM

## 2024-04-15 DIAGNOSIS — M54.2 CERVICALGIA: Primary | ICD-10-CM

## 2024-04-15 PROCEDURE — 97112 NEUROMUSCULAR REEDUCATION: CPT

## 2024-04-15 PROCEDURE — 97110 THERAPEUTIC EXERCISES: CPT

## 2024-04-15 PROCEDURE — 97140 MANUAL THERAPY 1/> REGIONS: CPT

## 2024-04-15 NOTE — PROGRESS NOTES
"Daily Note     Today's date: 4/15/2024  Patient name: Jenny Sepulveda  : 1975  MRN: [id number]  Referring provider: Endy Aggarwal,*  Dx:   Encounter Diagnosis     ICD-10-CM    1. Cervicalgia  M54.2       2. Radiculopathy, cervical  M54.12       3. Cervical spondylosis with radiculopathy  M47.22           Start Time: 1545  Stop Time: 1630  Total time in clinic (min): 45 minutes    Subjective: Pt reports she had relief for about 30 minutes after last session. Does not have a headache today but is still very sore.      Objective: See treatment diary below      Assessment: Tolerated treatment well. Patient demonstrated fatigue post treatment, exhibited good technique with therapeutic exercises, and would benefit from continued PT. Pt continues to respond to tx interventions denying any increase in pain or radicular symptoms during or following session. She denied headache throughout tx and noted only muscle soreness following session..       Plan: Progress treatment as tolerated.       Precautions: None    POC expires Unit limit Auth Expiration date PT/OT + Visit Limit?   24 N/A N/A BOMN                 Visit/Unit Tracking  AUTH Status:  Date 4/4 4/9 4/11 4/15           N/A Used 1 2 3 4            Remaining                     FOTO        Manuals 4/4 4/9 4/11 4/15         SOR 5' CG CG CG         STM B UT 5' CG CG CG         Median NG B 3'            L rot CPT  CG           Manual traction   CG CG         Supine OA hold relax   LOIS          Neuro Re-Ed             Pt education 10' 5'           Chin tuck seated 15x5\" 15x5\" 15x5\" 20x5\"         Seated median NG  15x ea X15 ea 15x ea         Seated no monies  Red x20 Red x20 Grn x20         TB mid/low rows  Grn 2x10 ea Grn 2x10 ea Grn 2x10 ea Blue        Supine D2 flex   Red x15 ea Red x20 ea                      Ther Ex             T/S ext over 1/2 roll  15x3\" 15x3\" 15x3\"         T/S rot over 1/2 roll  15x3\" 15x3\" 15x3\"         CS L rot SNAG  15x3\" 15x3\" " "15x3\"                                                                          Ther Activity                                       Gait Training                                       Modalities                                                "

## 2024-04-18 ENCOUNTER — OFFICE VISIT (OUTPATIENT)
Dept: PHYSICAL THERAPY | Facility: CLINIC | Age: 49
End: 2024-04-18
Payer: COMMERCIAL

## 2024-04-18 DIAGNOSIS — M54.12 RADICULOPATHY, CERVICAL: ICD-10-CM

## 2024-04-18 DIAGNOSIS — M54.2 CERVICALGIA: Primary | ICD-10-CM

## 2024-04-18 DIAGNOSIS — M47.22 CERVICAL SPONDYLOSIS WITH RADICULOPATHY: ICD-10-CM

## 2024-04-18 PROCEDURE — 97110 THERAPEUTIC EXERCISES: CPT

## 2024-04-18 PROCEDURE — 97140 MANUAL THERAPY 1/> REGIONS: CPT

## 2024-04-18 PROCEDURE — 97112 NEUROMUSCULAR REEDUCATION: CPT

## 2024-04-18 NOTE — PROGRESS NOTES
"Daily Note     Today's date: 2024  Patient name: Jenny Sepulveda  : 1975  MRN: [id number]  Referring provider: Endy Aggarwal,*  Dx:   Encounter Diagnosis     ICD-10-CM    1. Cervicalgia  M54.2       2. Radiculopathy, cervical  M54.12       3. Cervical spondylosis with radiculopathy  M47.22           Start Time: 1545  Stop Time: 1630  Total time in clinic (min): 45 minutes    Subjective: Pt reports she had an injection done and states it was slightly helpful.       Objective: See treatment diary below      Assessment: Tolerated treatment well. Patient demonstrated fatigue post treatment, exhibited good technique with therapeutic exercises, and would benefit from continued PT. Continued to progress resistance and reps to continue improving postural strength and stability. She continues to be challenged by program but notes a reduction in muscle tightness and stiffness following tx session. Plan to progress cervical stability exercises next session.       Plan: Progress treatment as tolerated.       Precautions: None    POC expires Unit limit Auth Expiration date PT/OT + Visit Limit?   24 N/A N/A BOMN                 Visit/Unit Tracking  AUTH Status:  Date 4/4 4/9 4/11 4/15 4/18          N/A Used 1 2 3 4 5           Remaining                     FOTO        Manuals 4/4 4/9 4/11 4/15 4/18        SOR 5' CG CG CG CG        STM B UT 5' CG CG CG CG        Median NG B 3'            L rot CPT  CG           Manual traction   CG CG CG        Supine OA hold relax   LOIS          Neuro Re-Ed             Pt education 10' 5'           Chin tuck seated 15x5\" 15x5\" 15x5\" 20x5\" 20x5\"        Seated median NG  15x ea X15 ea 15x ea 15x ea        Seated no monies  Red x20 Red x20 Grn x20 Grn x20        TB mid/low rows  Grn 2x10 ea Grn 2x10 ea Grn 2x10 ea Blue 3x10 ea        Supine D2 flex   Red x15 ea Red x20 ea Red x20 ea                     Ther Ex             T/S ext over 1/2 roll  15x3\" 15x3\" 15x3\" 15x3\"      " "  T/S rot over 1/2 roll  15x3\" 15x3\" 15x3\" 15x3\"        CS L rot SNAG  15x3\" 15x3\" 15x3\" 15x3\"                                                                         Ther Activity                                       Gait Training                                       Modalities                                                  "

## 2024-04-22 ENCOUNTER — TELEPHONE (OUTPATIENT)
Age: 49
End: 2024-04-22

## 2024-04-23 ENCOUNTER — OFFICE VISIT (OUTPATIENT)
Dept: PHYSICAL THERAPY | Facility: CLINIC | Age: 49
End: 2024-04-23
Payer: COMMERCIAL

## 2024-04-23 DIAGNOSIS — M54.2 CERVICALGIA: Primary | ICD-10-CM

## 2024-04-23 DIAGNOSIS — M47.22 CERVICAL SPONDYLOSIS WITH RADICULOPATHY: ICD-10-CM

## 2024-04-23 DIAGNOSIS — M54.12 RADICULOPATHY, CERVICAL: ICD-10-CM

## 2024-04-23 PROCEDURE — 97110 THERAPEUTIC EXERCISES: CPT

## 2024-04-23 PROCEDURE — 97112 NEUROMUSCULAR REEDUCATION: CPT

## 2024-04-23 PROCEDURE — 97140 MANUAL THERAPY 1/> REGIONS: CPT

## 2024-04-23 NOTE — PROGRESS NOTES
"Daily Note     Today's date: 2024  Patient name: Jenny Sepulveda  : 1975  MRN: [id number]  Referring provider: Endy Aggarwal,*  Dx:   Encounter Diagnosis     ICD-10-CM    1. Cervicalgia  M54.2       2. Radiculopathy, cervical  M54.12       3. Cervical spondylosis with radiculopathy  M47.22           Start Time: 1545  Stop Time: 1630  Total time in clinic (min): 45 minutes    Subjective: Pt reports she feels about the same since it feels like th injection has worn off.       Objective: See treatment diary below      Assessment: Tolerated treatment well. Patient demonstrated fatigue post treatment, exhibited good technique with therapeutic exercises, and would benefit from continued PT. Added in DNF this session to continue improve cervical musculature stability. She was challenged by progression but denied any increase in symptoms at end of session.      Plan: Progress treatment as tolerated.       Precautions: None    POC expires Unit limit Auth Expiration date PT/OT + Visit Limit?   24 N/A N/A BOMN                 Visit/Unit Tracking  AUTH Status:  Date 4/4 4/9 4/11 4/15 4/18 4/23         N/A Used 1 2 3 4 5 6          Remaining                     FOTO        Manuals 4/4 4/9 4/11 4/15 4/18 4/23       SOR 5' CG CG CG CG CG       STM B UT 5' CG CG CG CG        Median NG B 3'            L rot CPT  CG           Manual traction   CG CG CG CG       Supine OA hold relax   LOIS          Neuro Re-Ed             Pt education 10' 5'    5'       Chin tuck seated 15x5\" 15x5\" 15x5\" 20x5\" 20x5\" 20x5\"       Seated median NG  15x ea X15 ea 15x ea 15x ea 15x ea       Seated no monies  Red x20 Red x20 Grn x20 Grn x20 Grn x20       TB mid/low rows  Grn 2x10 ea Grn 2x10 ea Grn 2x10 ea Blue 3x10 ea Blue 3x10       Supine D2 flex   Red x15 ea Red x20 ea Red x20 ea Red x20 ea       Supine DNF      15x5\"       Ther Ex             T/S ext over 1/2 roll  15x3\" 15x3\" 15x3\" 15x3\" 15x3\"       T/S rot over 1/2 roll  15x3\" " "15x3\" 15x3\" 15x3\" 15x3\"       CS L rot SNAG  15x3\" 15x3\" 15x3\" 15x3\" 15x3\"                                                                        Ther Activity                                       Gait Training                                       Modalities                                                    "

## 2024-04-25 ENCOUNTER — OFFICE VISIT (OUTPATIENT)
Dept: PHYSICAL THERAPY | Facility: CLINIC | Age: 49
End: 2024-04-25
Payer: COMMERCIAL

## 2024-04-25 DIAGNOSIS — M54.12 RADICULOPATHY, CERVICAL: ICD-10-CM

## 2024-04-25 DIAGNOSIS — M47.22 CERVICAL SPONDYLOSIS WITH RADICULOPATHY: ICD-10-CM

## 2024-04-25 DIAGNOSIS — M54.2 CERVICALGIA: Primary | ICD-10-CM

## 2024-04-25 PROCEDURE — 97140 MANUAL THERAPY 1/> REGIONS: CPT

## 2024-04-25 PROCEDURE — 97112 NEUROMUSCULAR REEDUCATION: CPT

## 2024-04-25 PROCEDURE — 97110 THERAPEUTIC EXERCISES: CPT

## 2024-04-25 NOTE — PROGRESS NOTES
"Daily Note     Today's date: 2024  Patient name: Jenny Sepulveda  : 1975  MRN: [id number]  Referring provider: Endy Aggarwal,*  Dx:   Encounter Diagnosis     ICD-10-CM    1. Cervicalgia  M54.2       2. Radiculopathy, cervical  M54.12       3. Cervical spondylosis with radiculopathy  M47.22           Start Time: 1545  Stop Time: 1630  Total time in clinic (min): 45 minutes    Subjective: Pt reports she feels about the same with her pain.       Objective: See treatment diary below      Assessment: Tolerated treatment well. Patient demonstrated fatigue post treatment, exhibited good technique with therapeutic exercises, and would benefit from continued PT. Pt demonstrates improved cervical AROM and slight reduction in painful symptoms with addition of STM and trigger point release of levator scapulae on left. She was instructed in stretch for levator scap to add to HEP and will reassess effects at next session.       Plan: Continue per plan of care.      Precautions: None    POC expires Unit limit Auth Expiration date PT/OT + Visit Limit?   24 N/A N/A BOMN                 Visit/Unit Tracking  AUTH Status:  Date 4/4 4/9 4/11 4/15 4/18 4/23 4/25        N/A Used 1 2 3 4 5 6 7         Remaining                     FOTO        Manuals 4/4 4/9 4/11 4/15 4/18 4/23 4/25      SOR 5' CG CG CG CG CG       STM B UT 5' CG CG CG CG        Median NG B 3'            STM UT/Levator with inferior glide       CG      Manual traction   CG CG CG CG       Supine OA hold relax   LOIS          Neuro Re-Ed             Pt education 10' 5'    5' 5'      Chin tuck seated 15x5\" 15x5\" 15x5\" 20x5\" 20x5\" 20x5\" 20x5\"      Seated median NG  15x ea X15 ea 15x ea 15x ea 15x ea X15 ea      Seated no monies  Red x20 Red x20 Grn x20 Grn x20 Grn x20 Grn x20      TB mid/low rows  Grn 2x10 ea Grn 2x10 ea Grn 2x10 ea Blue 3x10 ea Blue 3x10 nv      Supine D2 flex   Red x15 ea Red x20 ea Red x20 ea Red x20 ea nv      Supine DNF      15x5\" nv " "     Ther Ex             T/S ext over 1/2 roll  15x3\" 15x3\" 15x3\" 15x3\" 15x3\" 15x3\"      T/S rot over 1/2 roll  15x3\" 15x3\" 15x3\" 15x3\" 15x3\" 15x3\"      CS L rot SNAG  15x3\" 15x3\" 15x3\" 15x3\" 15x3\" 15x3\"      Levator scap str       20\"x3                                                          Ther Activity                                       Gait Training                                       Modalities                                                      "

## 2024-04-30 ENCOUNTER — OFFICE VISIT (OUTPATIENT)
Dept: PHYSICAL THERAPY | Facility: CLINIC | Age: 49
End: 2024-04-30
Payer: COMMERCIAL

## 2024-04-30 DIAGNOSIS — M54.2 CERVICALGIA: Primary | ICD-10-CM

## 2024-04-30 DIAGNOSIS — M47.22 CERVICAL SPONDYLOSIS WITH RADICULOPATHY: ICD-10-CM

## 2024-04-30 DIAGNOSIS — M54.12 RADICULOPATHY, CERVICAL: ICD-10-CM

## 2024-04-30 PROCEDURE — 97140 MANUAL THERAPY 1/> REGIONS: CPT

## 2024-04-30 PROCEDURE — 97110 THERAPEUTIC EXERCISES: CPT

## 2024-04-30 PROCEDURE — 97112 NEUROMUSCULAR REEDUCATION: CPT

## 2024-04-30 NOTE — PROGRESS NOTES
"Daily Note     Today's date: 2024  Patient name: Jenny Sepulveda  : 1975  MRN: [id number]  Referring provider: Endy Aggarwal,*  Dx:   Encounter Diagnosis     ICD-10-CM    1. Cervicalgia  M54.2       2. Radiculopathy, cervical  M54.12       3. Cervical spondylosis with radiculopathy  M47.22           Start Time: 1545  Stop Time: 1630  Total time in clinic (min): 45 minutes    Subjective: Pt reports she feels about the same, no significant changes after last session.       Objective: See treatment diary below      Assessment: Tolerated treatment well. Patient demonstrated fatigue post treatment, exhibited good technique with therapeutic exercises, and would benefit from continued PT. Pt continues to have increased pain and muscle spasm in left UT and levator with mild relief in symptoms noted with manual interventions this session. Will continue to adjust tx plan based on pt's tolerance to activity.      Plan: Continue per plan of care.      Precautions: None    POC expires Unit limit Auth Expiration date PT/OT + Visit Limit?   24 N/A N/A BOMN                 Visit/Unit Tracking  AUTH Status:  Date 4/4 4/9 4/11 4/15 4/18 4/23 4/25 4/30       N/A Used 1 2 3 4 5 6 7 8        Remaining                     FOTO        Manuals 4/4 4/9 4/11 4/15 4/18 4/23 4/25 4/30     SOR 5' CG CG CG CG CG       IASTM L UT        CG     Median NG B 3'            STM UT/Levator with inferior glide       CG CG     Manual traction   CG CG CG CG       Supine OA hold relax   LOIS          T/S gr V mobs        CG     Neuro Re-Ed             Pt education 10' 5'    5' 5'      Chin tuck seated 15x5\" 15x5\" 15x5\" 20x5\" 20x5\" 20x5\" 20x5\" 20x5\"     Seated median NG  15x ea X15 ea 15x ea 15x ea 15x ea X15 ea x15ea     Seated no monies  Red x20 Red x20 Grn x20 Grn x20 Grn x20 Grn x20 Grn x20     TB mid/low rows  Grn 2x10 ea Grn 2x10 ea Grn 2x10 ea Blue 3x10 ea Blue 3x10 nv      Supine D2 flex   Red x15 ea Red x20 ea Red x20 ea Red " "x20 ea nv      Supine DNF      15x5\" nv      Ther Ex             T/S ext over 1/2 roll  15x3\" 15x3\" 15x3\" 15x3\" 15x3\" 15x3\" 20x3\"     T/S rot over 1/2 roll  15x3\" 15x3\" 15x3\" 15x3\" 15x3\" 15x3\" 20x3\"     CS L rot SNAG  15x3\" 15x3\" 15x3\" 15x3\" 15x3\" 15x3\"      Levator scap str       20\"x3 20\"x3                                                         Ther Activity                                       Gait Training                                       Modalities                                                        "

## 2024-05-02 ENCOUNTER — OFFICE VISIT (OUTPATIENT)
Dept: PHYSICAL THERAPY | Facility: CLINIC | Age: 49
End: 2024-05-02
Payer: COMMERCIAL

## 2024-05-02 DIAGNOSIS — M54.12 RADICULOPATHY, CERVICAL: ICD-10-CM

## 2024-05-02 DIAGNOSIS — M47.22 CERVICAL SPONDYLOSIS WITH RADICULOPATHY: ICD-10-CM

## 2024-05-02 DIAGNOSIS — M54.2 CERVICALGIA: Primary | ICD-10-CM

## 2024-05-02 PROCEDURE — 97110 THERAPEUTIC EXERCISES: CPT

## 2024-05-02 PROCEDURE — 97112 NEUROMUSCULAR REEDUCATION: CPT

## 2024-05-02 NOTE — PROGRESS NOTES
"Daily Note     Today's date: 2024  Patient name: Jenny Sepulveda  : 1975  MRN: [id number]  Referring provider: Endy Aggarwal,*  Dx:   Encounter Diagnosis     ICD-10-CM    1. Cervicalgia  M54.2       2. Radiculopathy, cervical  M54.12       3. Cervical spondylosis with radiculopathy  M47.22           Start Time: 1547  Stop Time: 1630  Total time in clinic (min): 43 minutes    Subjective: Pt reports she was more stressed today and feels her neck is in more pain because of it. She had an injection the other day and feels it hasn't helped.       Objective: See treatment diary below      Assessment: Tolerated treatment well. Patient demonstrated fatigue post treatment, exhibited good technique with therapeutic exercises, and would benefit from continued PT. Held on manual interventions this session due to no significant change in symptoms last session. She is able to complete all exercises this session without new or increased symptoms. Verbal cues provided to ensure proper form and technique with exercises.       Plan: Progress treatment as tolerated.       Precautions: None    POC expires Unit limit Auth Expiration date PT/OT + Visit Limit?   24 N/A N/A BOMN                 Visit/Unit Tracking  AUTH Status:  Date 4/4 4/9 4/11 4/15 4/18 4/23 4/25 4/30 5/2      N/A Used 1 2 3 4 5 6 7 8 9       Remaining                     FOTO        Manuals 4/4 4/9 4/11 4/15 4/18 4/23 4/25 4/30 5    SOR 5' CG CG CG CG CG       IASTM L UT        CG     Median NG B 3'            STM UT/Levator with inferior glide       CG CG     Manual traction   CG CG CG CG       Supine OA hold relax   LOIS          T/S gr V mobs        CG     Neuro Re-Ed             Pt education 10' 5'    5' 5'      Chin tuck seated 15x5\" 15x5\" 15x5\" 20x5\" 20x5\" 20x5\" 20x5\" 20x5\" 20x5\"    Seated median NG  15x ea X15 ea 15x ea 15x ea 15x ea X15 ea x15ea x15ea    Seated no monies  Red x20 Red x20 Grn x20 Grn x20 Grn x20 Grn x20 Grn x20 Grn x20 ea  " "  TB mid/low rows  Grn 2x10 ea Grn 2x10 ea Grn 2x10 ea Blue 3x10 ea Blue 3x10 nv  Blk 3x10    Supine D2 flex   Red x15 ea Red x20 ea Red x20 ea Red x20 ea nv      Supine DNF      15x5\" nv      Ther Ex             T/S ext over 1/2 roll  15x3\" 15x3\" 15x3\" 15x3\" 15x3\" 15x3\" 20x3\" 20x3\"    T/S rot over 1/2 roll  15x3\" 15x3\" 15x3\" 15x3\" 15x3\" 15x3\" 20x3\" 20x3\"    CS L rot SNAG  15x3\" 15x3\" 15x3\" 15x3\" 15x3\" 15x3\"      Levator scap str       20\"x3 20\"x3                                                         Ther Activity                                       Gait Training                                       Modalities                                                          "

## 2024-05-06 ENCOUNTER — APPOINTMENT (OUTPATIENT)
Dept: PHYSICAL THERAPY | Facility: CLINIC | Age: 49
End: 2024-05-06
Payer: COMMERCIAL

## 2024-05-09 ENCOUNTER — OFFICE VISIT (OUTPATIENT)
Dept: NEUROSURGERY | Facility: CLINIC | Age: 49
End: 2024-05-09
Payer: COMMERCIAL

## 2024-05-09 VITALS
WEIGHT: 133 LBS | SYSTOLIC BLOOD PRESSURE: 118 MMHG | DIASTOLIC BLOOD PRESSURE: 70 MMHG | BODY MASS INDEX: 25.11 KG/M2 | RESPIRATION RATE: 18 BRPM | TEMPERATURE: 98.1 F | HEART RATE: 84 BPM | OXYGEN SATURATION: 99 % | HEIGHT: 61 IN

## 2024-05-09 DIAGNOSIS — M54.12 RADICULOPATHY, CERVICAL: ICD-10-CM

## 2024-05-09 DIAGNOSIS — M54.2 CERVICALGIA: Primary | ICD-10-CM

## 2024-05-09 DIAGNOSIS — M47.22 CERVICAL SPONDYLOSIS WITH RADICULOPATHY: ICD-10-CM

## 2024-05-09 PROCEDURE — 99213 OFFICE O/P EST LOW 20 MIN: CPT | Performed by: NEUROLOGICAL SURGERY

## 2024-05-09 RX ORDER — CHLORHEXIDINE GLUCONATE ORAL RINSE 1.2 MG/ML
15 SOLUTION DENTAL ONCE
OUTPATIENT
Start: 2024-05-09 | End: 2024-05-09

## 2024-05-09 RX ORDER — CEFAZOLIN SODIUM 1 G/50ML
1000 SOLUTION INTRAVENOUS ONCE
OUTPATIENT
Start: 2024-05-09 | End: 2024-05-09

## 2024-05-09 RX ORDER — TIZANIDINE 4 MG/1
4 TABLET ORAL
COMMUNITY
Start: 2024-02-02

## 2024-05-09 RX ORDER — GABAPENTIN 300 MG/1
1 CAPSULE ORAL 2 TIMES DAILY
COMMUNITY
Start: 2024-02-02

## 2024-05-09 NOTE — PROGRESS NOTES
DISCUSSION SUMMARY   This is a 48 y.o. female with cervical spondylosis and medically intractable cervical radiculopathy who has failed to improve with physical therapy or epidural steroid injections.  I have recommended surgical intervention for her.  X-rays demonstrate a near collapse of the C6-7 interspace meaning that she would need to have an anterior cervical discectomy at this level.  Hybrid constructs with arthroplasties and fusions are not covered by insurance therefore double level fusion would be the only choice from a surgical perspective.  This was thoroughly and completely discussed with the patient and her .    The risks, benefits and complications of surgery were explained in detail to Jenny Sepulveda and her  including hemorrhage, infection, CSF leakage, wound problems, pain, weakness, numbness, dysesthesiae, paralysis, coma, and death. Also, the possibility  of further surgery being required was emphasized.     Other potential medical complications were outlined, including deep venous thrombosis, pulmonary embolism, pneumonia, urinary tract infection, myocardial infarction,  and stroke.     The need for physical therapy, occupational therapy, and rehabilitation was also mentioned. The alternatives to surgery were discussed. Jenny Sepulveda and her  asked relevant questions and asked that we proceed with arrangements for surgery.     No follow-ups on file.      Diagnosis ICD-10-CM Associated Orders   1. Cervicalgia  M54.2 Case request operating room: Anterior cervical discectomy and fixation fusion C5-6 and C6-7     Case request operating room: Anterior cervical discectomy and fixation fusion C5-6 and C6-7      2. Radiculopathy, cervical  M54.12 Case request operating room: Anterior cervical discectomy and fixation fusion C5-6 and C6-7     Case request operating room: Anterior cervical discectomy and fixation fusion C5-6 and C6-7      3. Cervical spondylosis with radiculopathy   M47.22 Case request operating room: Anterior cervical discectomy and fixation fusion C5-6 and C6-7     Case request operating room: Anterior cervical discectomy and fixation fusion C5-6 and C6-7           Chief Complaint   Patient presents with    Consult     TO DISCUSS SURGERY        HPIseen in follow up after a March visit  Went through physical therapy which increased the pain with no improvement  EDSI with minimal short term relief.  Sharp constant neck pain radiating into both shoulders and down both arms  Pain radiates to the interscapular region  Weakness in both hands and arms  Numbness in pinky and ring fingers bilaterally  Headaches with the neck pain  Sleep minimally improved  When she does have neck pain she is nearly incapacitated and has to spend the rest of the time on the couch with minimal movement.  She takes medications in an effort to eliminate the discomfort.      Review of Systems   Constitutional: Negative.    HENT: Negative.     Eyes:  Positive for visual disturbance (noticed blurry vision around the time the neck pain started in early December of 2023).   Respiratory: Negative.     Cardiovascular: Negative.    Gastrointestinal: Negative.    Endocrine: Negative.    Genitourinary: Negative.    Musculoskeletal:  Positive for back pain (will feel a sharp pain to the left of her spine in the upper back above bra line), myalgias (in the neck and resticts ROM and instant headache), neck pain (can be sharp constant pain radiating into spine, both shoulders and down arms) and neck stiffness. Negative for gait problem.        Possible neck injury in 2017 while skating and had a concussion    Skin: Negative.    Allergic/Immunologic: Negative.    Neurological:  Positive for weakness (in both arms and hands), numbness (in the pinky and ring finger of both hands, when holding her phone she notices pins & needles in hands) and headaches (with neck pain).   Hematological: Negative.   "  Psychiatric/Behavioral:  Negative for sleep disturbance (very uncomfortable to sleep, has pain at night).    I reviewed the ROS.    Vitals:    /70 (BP Location: Left arm, Patient Position: Sitting, Cuff Size: Adult)   Pulse 84   Temp 98.1 °F (36.7 °C) (Temporal)   Resp 18   Ht 5' 1\" (1.549 m)   Wt 60.3 kg (133 lb)   SpO2 99%   BMI 25.13 kg/m²     MEDICAL HISTORY  Past Medical History:   Diagnosis Date    SVT (supraventricular tachycardia)      Past Surgical History:   Procedure Laterality Date    APPENDECTOMY      BREAST SURGERY      BUNIONECTOMY Bilateral     CARDIAC ELECTROPHYSIOLOGY STUDY AND ABLATION      GANGLION CYST EXCISION      HYSTERECTOMY      LAPAROSCOPY       Social History     Tobacco Use    Smoking status: Former     Current packs/day: 0.00     Types: Cigarettes     Quit date:      Years since quittin.3    Smokeless tobacco: Never   Substance Use Topics    Alcohol use: No    Drug use: No      History reviewed. No pertinent family history.     Current Outpatient Medications:     DULoxetine (CYMBALTA) 20 mg capsule, Take 20 mg by mouth daily, Disp: , Rfl:     gabapentin (NEURONTIN) 300 mg capsule, Take 1 capsule by mouth 2 (two) times a day, Disp: , Rfl:     tiZANidine (ZANAFLEX) 4 mg tablet, Take 4 mg by mouth daily at bedtime, Disp: , Rfl:     loratadine (CLARITIN) 10 mg tablet, Take 10 mg by mouth daily, Disp: , Rfl:      Allergies   Allergen Reactions    Epinephrine Palpitations        The following portions of the patient's history were updated by MA and reviewed by MD: allergies, current medications, past family history, past medical history, past social history, past surgical history, and problem list.  Vitals and nursing note reviewed.     Physical examination (this physical examination was copied forward and updated as necessary (  Constitutional:       General: She is not in acute distress.     Appearance: Normal appearance. She is not ill-appearing, toxic-appearing or " diaphoretic.   HENT:      Head: Normocephalic.      Nose: Nose normal.   Eyes:      Extraocular Movements: Extraocular movements intact.      Pupils: Pupils are equal, round, and reactive to light.   Cardiovascular:      Rate and Rhythm: Normal rate.      Pulses: Normal pulses.   Pulmonary:      Effort: Pulmonary effort is normal.   Abdominal:      General: Abdomen is flat.   Musculoskeletal:         General: No swelling, tenderness, deformity or signs of injury.      Cervical back: Normal range of motion and neck supple. No rigidity.      Right lower leg: No edema.      Left lower leg: No edema.      Comments: Pain with passive rotation of the cervical spine,       She has pain to palpation of the lateral aspect of the deltoid   Lymphadenopathy:      Cervical: No cervical adenopathy.   Skin:     General: Skin is warm and dry.      Coloration: Skin is not jaundiced.      Findings: No erythema or rash.   Neurological:      General: No focal deficit present.      Mental Status: She is alert and oriented to person, place, and time.      Cranial Nerves: No cranial nerve deficit.      Sensory: Sensory deficit (Decreased fine touch and pinprick in the C7 distribution on the left) present.      Motor: Weakness (She has pain related weakness of the deltoid on the left and both biceps with the right side being affected to a greater extent than the left and the left tricep is weaker than the right tricep) present.      Coordination: Coordination normal.      Gait: Gait normal.      Deep Tendon Reflexes: Reflexes abnormal (Loss of the tricep reflex on the left and 1 out of 4 on the right biceps and brachioradialis are 2 out of 4 bilaterally).      Comments: She has decreased pinprick in the C5 and C6 and C7 distributions on the left side and the C6 distributions on the right side   Psychiatric:         Mood and Affect: Mood normal.         Behavior: Behavior normal.         Thought Content: Thought content normal.          Judgment: Judgment normal.       RESULTS/DATA  I have personally reviewed pertinent films in PACS  MRI of the cervical spine and plain x-rays are carefully reviewed.  These demonstrate a near collapse of the disc space and posterior projecting osteophytes producing neuroforaminal stenosis.

## 2024-07-13 LAB
APTT PPP: 28.4 SEC (ref 21.6–35.6)
B-HCG SERPL QL: 4 MIU/ML
BASOPHILS # BLD AUTO: 0.1 THOU/CMM (ref 0–0.1)
BASOPHILS NFR BLD AUTO: 1 %
CAOX CRY #/AREA URNS HPF: PRESENT /[HPF]
DIFFERENTIAL METHOD BLD: NORMAL
EOSINOPHIL # BLD AUTO: 0.1 THOU/CMM (ref 0–0.5)
EOSINOPHIL NFR BLD AUTO: 2 %
ERYTHROCYTE [DISTWIDTH] IN BLOOD BY AUTOMATED COUNT: 12.4 % (ref 12–16)
EST. AVERAGE GLUCOSE BLD GHB EST-MCNC: 103 MG/DL
GLUCOSE UR QL STRIP: NEGATIVE MG/DL
HBA1C MFR BLD: 5.2 %
HCT VFR BLD AUTO: 40.2 % (ref 35–43)
HGB BLD-MCNC: 13.5 G/DL (ref 11.5–14.5)
HGB UR QL STRIP: ABNORMAL MG/DL
INR PPP: 1.1
KETONES UR QL STRIP: NEGATIVE MG/DL
LEUKOCYTE ESTERASE UR QL STRIP: NEGATIVE /UL
LYMPHOCYTES # BLD AUTO: 2 THOU/CMM (ref 1–3)
LYMPHOCYTES NFR BLD AUTO: 32 %
MCH RBC QN AUTO: 30.4 PG (ref 26–34)
MCHC RBC AUTO-ENTMCNC: 33.6 G/DL (ref 32–37)
MCV RBC AUTO: 91 FL (ref 80–100)
MONOCYTES # BLD AUTO: 0.5 THOU/CMM (ref 0.3–1)
MONOCYTES NFR BLD AUTO: 8 %
MUCOUS THREADS URNS QL MICRO: ABNORMAL
NEUTROPHILS # BLD AUTO: 3.4 THOU/CMM (ref 1.8–7.8)
NEUTROPHILS NFR BLD AUTO: 57 %
NITRITE UR QL STRIP: NEGATIVE
PH UR: 5 [PH] (ref 4.5–8)
PLATELET # BLD AUTO: 349 THOU/CMM (ref 140–350)
PMV BLD REES-ECKER: 8.7 FL (ref 7.5–11.3)
PROT 24H UR-MRATE: NEGATIVE MG/DL
PROTHROMBIN TIME: 13.8 SEC (ref 12–14.6)
RBC # BLD AUTO: 4.43 MILL/CMM (ref 3.7–4.7)
RBC #/AREA URNS HPF: ABNORMAL /HPF (ref 0–2)
SL AMB POCT URINE COMMENT: ABNORMAL
SP GR UR: 1.02 (ref 1–1.03)
SQUAMOUS #/AREA URNS HPF: ABNORMAL /LPF (ref 0–5)
WBC # BLD AUTO: 6 THOU/CMM (ref 4–10)
WBC #/AREA URNS HPF: ABNORMAL /HPF (ref 0–5)

## 2024-07-14 LAB
LEGIONELLA SPEC CULT: NORMAL
MRSA DNA SPEC QL NAA+PROBE: NORMAL
SL AMB TYPE AND SCREEN: NORMAL

## 2024-07-22 NOTE — PRE-PROCEDURE INSTRUCTIONS
Pre-Surgery Instructions:   Medication Instructions    DULoxetine (CYMBALTA) 20 mg capsule Take day of surgery.    tiZANidine (ZANAFLEX) 4 mg tablet Uses PRN- OK to take day of surgery    Medication instructions for day surgery reviewed. Please use only a sip of water to take your instructed medications. Avoid all over the counter vitamins, supplements and NSAIDS for one week prior to surgery per anesthesia guidelines. Tylenol is ok to take as needed.     You will receive a call one business day prior to surgery with an arrival time and hospital directions. If your surgery is scheduled on a Monday, the hospital will be calling you on the Friday prior to your surgery. If you have not heard from anyone by 8pm, please call the hospital supervisor through the hospital  at [phone]. ([place] [phone] or [place] [phone]).    Do not eat or drink anything after midnight the night before your surgery, including candy, mints, lifesavers, or chewing gum. Do not drink alcohol 24hrs before your surgery. Try not to smoke at least 24hrs before your surgery.       Follow the pre surgery showering instructions as listed in the “My Surgical Experience Booklet” or otherwise provided by your surgeon's office. Do not use a blade to shave the surgical area 1 week before surgery. It is okay to use a clean electric clippers up to 24 hours before surgery. Do not apply any lotions, creams, including makeup, cologne, deodorant, or perfumes after showering on the day of your surgery. Do not use dry shampoo, hair spray, hair gel, or any type of hair products.     No contact lenses, eye make-up, or artificial eyelashes. Remove nail polish, including gel polish, and any artificial, gel, or acrylic nails if possible. Remove all jewelry including rings and body piercing jewelry.     Wear causal clothing that is easy to take on and off. Consider your type of surgery.    Keep any valuables, jewelry, piercings at home. Please  bring any specially ordered equipment (sling, braces) if indicated.    Arrange for a responsible person to drive you to and from the hospital on the day of your surgery. Please confirm the visitor policy for the day of your procedure when you receive your phone call with an arrival time.     Call the surgeon's office with any new illnesses, exposures, or additional questions prior to surgery.    Please reference your “My Surgical Experience Booklet” for additional information to prepare for your upcoming surgery.    Pt instructed to stop nsaids and supplements one week prior to surgery. Pt verbalized understanding of shower and med instructions.

## 2024-07-24 LAB
ALBUMIN SERPL-MCNC: 4.2 G/DL (ref 3.5–5.7)
ALP SERPL-CCNC: 92 U/L (ref 35–120)
ALT SERPL-CCNC: 63 U/L
ANION GAP SERPL CALCULATED.3IONS-SCNC: 10 MMOL/L (ref 3–11)
AST SERPL-CCNC: 46 U/L
BILIRUB SERPL-MCNC: 0.5 MG/DL (ref 0.2–1)
BUN SERPL-MCNC: 16 MG/DL (ref 7–25)
CALCIUM SERPL-MCNC: 8.9 MG/DL (ref 8.5–10.1)
CHLORIDE SERPL-SCNC: 101 MMOL/L (ref 100–109)
CO2 SERPL-SCNC: 28 MMOL/L (ref 21–31)
CREAT SERPL-MCNC: 0.64 MG/DL (ref 0.4–1.1)
CYTOLOGY CMNT CVX/VAG CYTO-IMP: ABNORMAL
GFR/BSA.PRED SERPLBLD CYS-BASED-ARV: 110 ML/MIN/{1.73_M2}
GLUCOSE SERPL-MCNC: 86 MG/DL (ref 65–99)
POTASSIUM SERPL-SCNC: 4.7 MMOL/L (ref 3.5–5.2)
PROT SERPL-MCNC: 6.9 G/DL (ref 6.3–8.3)
SODIUM SERPL-SCNC: 139 MMOL/L (ref 135–145)

## 2024-07-25 ENCOUNTER — TELEPHONE (OUTPATIENT)
Dept: NEUROSURGERY | Facility: CLINIC | Age: 49
End: 2024-07-25

## 2024-07-25 NOTE — TELEPHONE ENCOUNTER
07/26/2024-Spoke to pt, advised received lab's (MRSA Culture results scanned under Media), PCP clearance, and Per [employer] No Auth Required Ref# [id number]    07/25/2024-Called pt, LMOM for call back to go over what is still needed prior to her upcoming 7/31 sx w/DKO.    Needed: MRSA Culture lab

## 2024-07-30 ENCOUNTER — ANESTHESIA EVENT (OUTPATIENT)
Dept: PERIOP | Facility: HOSPITAL | Age: 49
End: 2024-07-30
Payer: COMMERCIAL

## 2024-07-30 ENCOUNTER — DOCUMENTATION (OUTPATIENT)
Dept: NEUROSURGERY | Facility: CLINIC | Age: 49
End: 2024-07-30

## 2024-07-30 NOTE — ANESTHESIA PREPROCEDURE EVALUATION
Procedure:  Anterior cervical discectomy and fixation fusion C5-6 and C6-7 (Spine Cervical)    Hx of SVT - unknown clinical course/treatment    Relevant Problems   Other   (+) History of colon polyps        Physical Exam    Airway    Mallampati score: II  TM Distance: >3 FB  Neck ROM: full     Dental   No notable dental hx     Cardiovascular  Rhythm: regular, Rate: normal, Cardiovascular exam normal    Pulmonary  Pulmonary exam normal Breath sounds clear to auscultation    Other Findings  post-pubertal.      Anesthesia Plan  ASA Score- 2     Anesthesia Type- general with ASA Monitors.         Additional Monitors:     Airway Plan: ETT.    Comment: Risks of general anesthesia discussed including likely possibility of PONV and sore throat, as well as the rare possibilities of aspiration, dental/oropharyngeal/ocular injuries, or grave/life threatening anesthetic and surgical emergencies..       Plan Factors-Exercise tolerance (METS): >4 METS.    Chart reviewed.    Patient summary reviewed.      Patient instructed to abstain from smoking on day of procedure. Patient did not smoke on day of surgery.            Induction- intravenous.    Postoperative Plan- Plan for postoperative opioid use. Planned trial extubation        Informed Consent- Anesthetic plan and risks discussed with patient.  I personally reviewed this patient with the CRNA. Discussed and agreed on the Anesthesia Plan with the CRNA..

## 2024-07-31 ENCOUNTER — HOSPITAL ENCOUNTER (OUTPATIENT)
Facility: HOSPITAL | Age: 49
Setting detail: OUTPATIENT SURGERY
Discharge: HOME | End: 2024-07-31
Attending: NEUROLOGICAL SURGERY | Admitting: NEUROLOGICAL SURGERY
Payer: COMMERCIAL

## 2024-07-31 ENCOUNTER — ANESTHESIA (OUTPATIENT)
Dept: PERIOP | Facility: HOSPITAL | Age: 49
End: 2024-07-31
Payer: COMMERCIAL

## 2024-07-31 ENCOUNTER — APPOINTMENT (OUTPATIENT)
Dept: RADIOLOGY | Facility: HOSPITAL | Age: 49
End: 2024-07-31
Payer: COMMERCIAL

## 2024-07-31 VITALS
SYSTOLIC BLOOD PRESSURE: 131 MMHG | WEIGHT: 133 LBS | RESPIRATION RATE: 16 BRPM | TEMPERATURE: 96.9 F | HEART RATE: 93 BPM | HEIGHT: 61 IN | OXYGEN SATURATION: 93 % | DIASTOLIC BLOOD PRESSURE: 87 MMHG | BODY MASS INDEX: 25.11 KG/M2

## 2024-07-31 DIAGNOSIS — Z98.890 POST-OPERATIVE STATE: Primary | ICD-10-CM

## 2024-07-31 LAB
ABO GROUP BLD: NORMAL
BLD GP AB SCN SERPL QL: NEGATIVE
RH BLD: POSITIVE
SPECIMEN EXPIRATION DATE: NORMAL

## 2024-07-31 PROCEDURE — C1713 ANCHOR/SCREW BN/BN,TIS/BN: HCPCS | Performed by: NEUROLOGICAL SURGERY

## 2024-07-31 PROCEDURE — 86850 RBC ANTIBODY SCREEN: CPT | Performed by: NEUROLOGICAL SURGERY

## 2024-07-31 PROCEDURE — 86901 BLOOD TYPING SEROLOGIC RH(D): CPT | Performed by: NEUROLOGICAL SURGERY

## 2024-07-31 PROCEDURE — 86900 BLOOD TYPING SEROLOGIC ABO: CPT | Performed by: NEUROLOGICAL SURGERY

## 2024-07-31 PROCEDURE — 72020 X-RAY EXAM OF SPINE 1 VIEW: CPT

## 2024-07-31 DEVICE — SCREW 7713513 ZEVO VAR SD 3.5MM X 13MM
Type: IMPLANTABLE DEVICE | Site: SPINE CERVICAL | Status: FUNCTIONAL
Brand: ZEVO™ ANTERIOR CERVICAL PLATE SYSTEM

## 2024-07-31 DEVICE — IMPLANT 6240664 ANATOMIC 16X14X6MM
Type: IMPLANTABLE DEVICE | Site: SPINE CERVICAL | Status: FUNCTIONAL
Brand: VERTE-STACK® SPINAL SYSTEM

## 2024-07-31 RX ORDER — LIDOCAINE HYDROCHLORIDE AND EPINEPHRINE 10; 10 MG/ML; UG/ML
INJECTION, SOLUTION INFILTRATION; PERINEURAL AS NEEDED
Status: DISCONTINUED | OUTPATIENT
Start: 2024-07-31 | End: 2024-07-31 | Stop reason: HOSPADM

## 2024-07-31 RX ORDER — CHLORHEXIDINE GLUCONATE ORAL RINSE 1.2 MG/ML
15 SOLUTION DENTAL ONCE
Status: COMPLETED | OUTPATIENT
Start: 2024-07-31 | End: 2024-07-31

## 2024-07-31 RX ORDER — SODIUM CHLORIDE, SODIUM LACTATE, POTASSIUM CHLORIDE, CALCIUM CHLORIDE 600; 310; 30; 20 MG/100ML; MG/100ML; MG/100ML; MG/100ML
125 INJECTION, SOLUTION INTRAVENOUS CONTINUOUS
Status: DISCONTINUED | OUTPATIENT
Start: 2024-07-31 | End: 2024-07-31 | Stop reason: HOSPADM

## 2024-07-31 RX ORDER — HYDROMORPHONE HCL/PF 1 MG/ML
SYRINGE (ML) INJECTION AS NEEDED
Status: DISCONTINUED | OUTPATIENT
Start: 2024-07-31 | End: 2024-07-31

## 2024-07-31 RX ORDER — CEFAZOLIN SODIUM 1 G/50ML
1000 SOLUTION INTRAVENOUS ONCE
Status: COMPLETED | OUTPATIENT
Start: 2024-07-31 | End: 2024-07-31

## 2024-07-31 RX ORDER — ONDANSETRON 2 MG/ML
INJECTION INTRAMUSCULAR; INTRAVENOUS AS NEEDED
Status: DISCONTINUED | OUTPATIENT
Start: 2024-07-31 | End: 2024-07-31

## 2024-07-31 RX ORDER — FENTANYL CITRATE/PF 50 MCG/ML
25 SYRINGE (ML) INJECTION
Status: DISCONTINUED | OUTPATIENT
Start: 2024-07-31 | End: 2024-07-31 | Stop reason: HOSPADM

## 2024-07-31 RX ORDER — HYDROMORPHONE HCL/PF 1 MG/ML
0.5 SYRINGE (ML) INJECTION
Status: DISCONTINUED | OUTPATIENT
Start: 2024-07-31 | End: 2024-07-31 | Stop reason: HOSPADM

## 2024-07-31 RX ORDER — ONDANSETRON 2 MG/ML
4 INJECTION INTRAMUSCULAR; INTRAVENOUS ONCE AS NEEDED
Status: DISCONTINUED | OUTPATIENT
Start: 2024-07-31 | End: 2024-07-31 | Stop reason: HOSPADM

## 2024-07-31 RX ORDER — MIDAZOLAM HYDROCHLORIDE 2 MG/2ML
INJECTION, SOLUTION INTRAMUSCULAR; INTRAVENOUS AS NEEDED
Status: DISCONTINUED | OUTPATIENT
Start: 2024-07-31 | End: 2024-07-31

## 2024-07-31 RX ORDER — EPHEDRINE SULFATE 50 MG/ML
INJECTION INTRAVENOUS AS NEEDED
Status: DISCONTINUED | OUTPATIENT
Start: 2024-07-31 | End: 2024-07-31

## 2024-07-31 RX ORDER — OXYCODONE HYDROCHLORIDE 5 MG/1
5 TABLET ORAL EVERY 4 HOURS PRN
Status: DISCONTINUED | OUTPATIENT
Start: 2024-07-31 | End: 2024-07-31 | Stop reason: HOSPADM

## 2024-07-31 RX ORDER — DIPHENHYDRAMINE HYDROCHLORIDE 50 MG/ML
12.5 INJECTION INTRAMUSCULAR; INTRAVENOUS ONCE AS NEEDED
Status: DISCONTINUED | OUTPATIENT
Start: 2024-07-31 | End: 2024-07-31 | Stop reason: HOSPADM

## 2024-07-31 RX ORDER — LIDOCAINE HYDROCHLORIDE 10 MG/ML
INJECTION, SOLUTION EPIDURAL; INFILTRATION; INTRACAUDAL; PERINEURAL AS NEEDED
Status: DISCONTINUED | OUTPATIENT
Start: 2024-07-31 | End: 2024-07-31

## 2024-07-31 RX ORDER — ACETAMINOPHEN 500 MG
1000 TABLET ORAL EVERY 8 HOURS
Start: 2024-07-31

## 2024-07-31 RX ORDER — DEXAMETHASONE SODIUM PHOSPHATE 10 MG/ML
INJECTION, SOLUTION INTRAMUSCULAR; INTRAVENOUS AS NEEDED
Status: DISCONTINUED | OUTPATIENT
Start: 2024-07-31 | End: 2024-07-31

## 2024-07-31 RX ORDER — SODIUM CHLORIDE, SODIUM LACTATE, POTASSIUM CHLORIDE, CALCIUM CHLORIDE 600; 310; 30; 20 MG/100ML; MG/100ML; MG/100ML; MG/100ML
INJECTION, SOLUTION INTRAVENOUS CONTINUOUS PRN
Status: DISCONTINUED | OUTPATIENT
Start: 2024-07-31 | End: 2024-07-31

## 2024-07-31 RX ORDER — ACETAMINOPHEN 325 MG/1
650 TABLET ORAL EVERY 4 HOURS PRN
Status: DISCONTINUED | OUTPATIENT
Start: 2024-07-31 | End: 2024-07-31 | Stop reason: HOSPADM

## 2024-07-31 RX ORDER — LIDOCAINE HYDROCHLORIDE 10 MG/ML
0.5 INJECTION, SOLUTION EPIDURAL; INFILTRATION; INTRACAUDAL; PERINEURAL ONCE AS NEEDED
Status: DISCONTINUED | OUTPATIENT
Start: 2024-07-31 | End: 2024-07-31 | Stop reason: HOSPADM

## 2024-07-31 RX ORDER — FENTANYL CITRATE 50 UG/ML
INJECTION, SOLUTION INTRAMUSCULAR; INTRAVENOUS AS NEEDED
Status: DISCONTINUED | OUTPATIENT
Start: 2024-07-31 | End: 2024-07-31

## 2024-07-31 RX ORDER — MAGNESIUM HYDROXIDE 1200 MG/15ML
LIQUID ORAL AS NEEDED
Status: DISCONTINUED | OUTPATIENT
Start: 2024-07-31 | End: 2024-07-31 | Stop reason: HOSPADM

## 2024-07-31 RX ORDER — PROPOFOL 10 MG/ML
INJECTION, EMULSION INTRAVENOUS AS NEEDED
Status: DISCONTINUED | OUTPATIENT
Start: 2024-07-31 | End: 2024-07-31

## 2024-07-31 RX ORDER — OXYCODONE HYDROCHLORIDE 5 MG/1
5 TABLET ORAL EVERY 4 HOURS PRN
Qty: 20 TABLET | Refills: 0 | Status: SHIPPED | OUTPATIENT
Start: 2024-07-31 | End: 2024-08-10

## 2024-07-31 RX ORDER — PHENYLEPHRINE HCL IN 0.9% NACL 1 MG/10 ML
SYRINGE (ML) INTRAVENOUS AS NEEDED
Status: DISCONTINUED | OUTPATIENT
Start: 2024-07-31 | End: 2024-07-31

## 2024-07-31 RX ORDER — ROCURONIUM BROMIDE 10 MG/ML
INJECTION, SOLUTION INTRAVENOUS AS NEEDED
Status: DISCONTINUED | OUTPATIENT
Start: 2024-07-31 | End: 2024-07-31

## 2024-07-31 RX ORDER — BUPIVACAINE HYDROCHLORIDE AND EPINEPHRINE 5; 5 MG/ML; UG/ML
INJECTION, SOLUTION EPIDURAL; INTRACAUDAL; PERINEURAL AS NEEDED
Status: DISCONTINUED | OUTPATIENT
Start: 2024-07-31 | End: 2024-07-31 | Stop reason: HOSPADM

## 2024-07-31 RX ADMIN — Medication 50 MCG: at 08:34

## 2024-07-31 RX ADMIN — ROCURONIUM BROMIDE 50 MG: 10 INJECTION, SOLUTION INTRAVENOUS at 08:21

## 2024-07-31 RX ADMIN — SUGAMMADEX 200 MG: 100 INJECTION, SOLUTION INTRAVENOUS at 10:20

## 2024-07-31 RX ADMIN — SODIUM CHLORIDE, SODIUM LACTATE, POTASSIUM CHLORIDE, AND CALCIUM CHLORIDE: .6; .31; .03; .02 INJECTION, SOLUTION INTRAVENOUS at 07:25

## 2024-07-31 RX ADMIN — CEFAZOLIN SODIUM 1000 MG: 1 SOLUTION INTRAVENOUS at 08:29

## 2024-07-31 RX ADMIN — EPHEDRINE SULFATE 10 MG: 50 INJECTION, SOLUTION INTRAVENOUS at 08:39

## 2024-07-31 RX ADMIN — PHENYLEPHRINE HYDROCHLORIDE 50 MCG/MIN: 10 INJECTION INTRAVENOUS at 08:52

## 2024-07-31 RX ADMIN — CHLORHEXIDINE GLUCONATE 0.12% ORAL RINSE 15 ML: 1.2 LIQUID ORAL at 06:27

## 2024-07-31 RX ADMIN — LIDOCAINE HYDROCHLORIDE 50 MG: 10 INJECTION, SOLUTION EPIDURAL; INFILTRATION; INTRACAUDAL; PERINEURAL at 08:20

## 2024-07-31 RX ADMIN — EPHEDRINE SULFATE 10 MG: 50 INJECTION, SOLUTION INTRAVENOUS at 08:43

## 2024-07-31 RX ADMIN — ACETAMINOPHEN 650 MG: 325 TABLET, FILM COATED ORAL at 12:19

## 2024-07-31 RX ADMIN — HYDROMORPHONE HYDROCHLORIDE 0.5 MG: 1 INJECTION, SOLUTION INTRAMUSCULAR; INTRAVENOUS; SUBCUTANEOUS at 10:28

## 2024-07-31 RX ADMIN — DEXAMETHASONE SODIUM PHOSPHATE 10 MG: 10 INJECTION, SOLUTION INTRAMUSCULAR; INTRAVENOUS at 08:22

## 2024-07-31 RX ADMIN — EPHEDRINE SULFATE 10 MG: 50 INJECTION, SOLUTION INTRAVENOUS at 08:35

## 2024-07-31 RX ADMIN — Medication 100 MCG: at 08:43

## 2024-07-31 RX ADMIN — MIDAZOLAM 2 MG: 1 INJECTION INTRAMUSCULAR; INTRAVENOUS at 08:12

## 2024-07-31 RX ADMIN — FENTANYL CITRATE 50 MCG: 50 INJECTION INTRAMUSCULAR; INTRAVENOUS at 08:20

## 2024-07-31 RX ADMIN — PROPOFOL 150 MG: 10 INJECTION, EMULSION INTRAVENOUS at 08:20

## 2024-07-31 RX ADMIN — ONDANSETRON 4 MG: 2 INJECTION INTRAMUSCULAR; INTRAVENOUS at 10:01

## 2024-07-31 RX ADMIN — SUGAMMADEX 200 MG: 100 INJECTION, SOLUTION INTRAVENOUS at 10:05

## 2024-07-31 RX ADMIN — FENTANYL CITRATE 50 MCG: 50 INJECTION INTRAMUSCULAR; INTRAVENOUS at 09:07

## 2024-07-31 RX ADMIN — Medication 200 MCG: at 08:51

## 2024-07-31 RX ADMIN — OXYCODONE HYDROCHLORIDE 5 MG: 5 TABLET ORAL at 11:33

## 2024-07-31 RX ADMIN — SODIUM CHLORIDE, SODIUM LACTATE, POTASSIUM CHLORIDE, AND CALCIUM CHLORIDE: .6; .31; .03; .02 INJECTION, SOLUTION INTRAVENOUS at 10:11

## 2024-07-31 RX ADMIN — FENTANYL CITRATE 25 MCG: 50 INJECTION INTRAMUSCULAR; INTRAVENOUS at 10:47

## 2024-07-31 NOTE — INTERVAL H&P NOTE
H&P reviewed. After examining the patient I find no changes in the patients condition since the H&P had been written.    Vitals:    07/31/24 0622   BP: 114/73   Pulse: 71   Temp: (!) 96.9 °F (36.1 °C)   SpO2: 100%

## 2024-07-31 NOTE — ANESTHESIA POSTPROCEDURE EVALUATION
Post-Op Assessment Note    CV Status:  Stable  Pain Score: 7    Pain management: satisfactory to patient       Mental Status:  Sleepy and arousable   Hydration Status:  Euvolemic   PONV Controlled:  Controlled   Airway Patency:  Patent     Post Op Vitals Reviewed: Yes    No anethesia notable event occurred.    Staff: ELIOT               /75 (07/31/24 1030)    Temp 97.7 °F (36.5 °C) (07/31/24 1030)    Pulse 90 (07/31/24 1030)   Resp 16 (07/31/24 1030)    SpO2 100 % (07/31/24 1030)

## 2024-07-31 NOTE — OP NOTE
OPERATIVE REPORT  PATIENT NAME: Jenny Sepulveda    :  1975  MRN: [id number]  Pt Location: [facility] OR ROOM 17    SURGERY DATE: 2024    Surgeons and Role:     * Endy Aggarwal MD - Primary    Preop Diagnosis:  Cervicalgia [M54.2]  Radiculopathy, cervical [M54.12]  Cervical spondylosis with radiculopathy [M47.22]    Post-Op Diagnosis Codes:     * Cervicalgia [M54.2]     * Radiculopathy, cervical [M54.12]     * Cervical spondylosis with radiculopathy [M47.22]    Procedure(s):  Anterior cervical discectomy and fixation fusion C5-6 and C6-7    Specimen(s):  * No specimens in log *    Estimated Blood Loss:   Minimal    Drains:  * No LDAs found *    Anesthesia Type:   General    Operative Indications:  Cervicalgia [M54.2]  Radiculopathy, cervical [M54.12]  Cervical spondylosis with radiculopathy [M47.22]      Operative Findings:  Cervical spondylosis      Complications:   None    Procedure and Technique:  After adequate general endotracheal anesthesia the patient was placed supine on the operating table.  The anterior neck was prepped with Betadine soap then DuraPrep.  Double layer drapes were placed in normal fashion and a 3M Betadine impregnated sticky drape was placed over these.      Time-out was called and all parameters a time-out were followed.    The skin of the anterior neck was injected with 1% lidocaine with 1 100,000 epinephrine.  A number 15 blade was used to incise the skin.  Bovie and bipolar were used throughout the procedure to maintain hemostasis.  The Bovie on the cutting setting was used to divide the tissues to the platysma.  The platysma was identified, coagulated, and divided.  A combination of blunt and sharp dissection was carried out to expose the structures of the anterior spinal space. The longus coli muscles were elevated from the underlying bone with the use of the Bovie.  The Rosenthal Blas retractor system was used to maintain operative exposure.  Intraoperative fluoroscopy was  used throughout the case to aid in the identification of level as well as positioning of construct.  There was radiology over-read.    The annulus of the C C5-6 was thus identified.  Combination of pituitary rongeurs, Kerrison rongeurs, and curettes as well as the Midas-Travis drill with a series of cornerstone barrel bits were utilized to perform a complete diskectomy.  Posterior projecting osteophytes were removed with the use of a 2.5 mm match stick on the Midas-Travis drill and curved curettes and Kerrison rongeurs.  Bilateral decompressive foraminotomies were performed with the use of curved curettes and Kerrison rongeurs as well as the Midas travis drill.  Next, a peek construct filled with demineralized bone matrix was impacted into position without difficulty.  This procedure was repeated at the C C6-7 levels.  A completely separate titanium anterior fixation plate spanning the C C5-7 levels was then secured into place with 13 mm very angle screws.  The locking mechanism was turned.  Final AP and lateral images were obtained fluoroscopically.    Valsalva x2 produced no bleeding.  Accordingly the platysma was approximated with interrupted inverted 3 0 Vicryl suture.  The skin was closed with interrupted inverted 3 0 Vicryl suture.  Benzoin and Steri-Strips were placed and a sterile dressing was placed.   I was present for the entire procedure.    Patient Disposition:  PACU         SIGNATURE: Endy Aggarwal MD  DATE: July 31, 2024  TIME: 10:20 AM

## 2024-07-31 NOTE — DISCHARGE INSTR - AVS FIRST PAGE
Discharge instructions  Anterior cervical decompression and fixation/fusion      Surgical incisional care:  Keep incision clean and dry. Avoid applying creams, lotion or antiseptic to incision area.  Allow steri-strips to fall off. If still in place at two week postoperative visit, we will remove them.  Check the wound daily. If the incision becomes red, swollen, tender, warm, or has increased drainage please notify physician immediately.  May shower 3 days after surgery, but do not soak in a tub and no swimming.  Use mild antimicrobial soap and water with a clean washcloth. Pat incision dry after showering and a clean towel daily.     Activity Restrictions:  No heavy lifting greater than 5 - 10lbs. No strenuous activities.  May walk as tolerated. Encourage at least 4 short walks per day.   No driving while requiring cervical collar, anticipated six weeks.  No significant neck movement.  Diet: consider soft minced food with gravy. Recommend small bites with sips of water between.     Postoperative medication:  Take pain medications to relieve incision pain, and muscle relaxants to prevent spasms as directed. Please see after visit summary (AVS) for details.   Take over the counter stool softeners such as colace or senna-s to avoid constipation while on narcotics. Intake water and fiber intake.   Do not take ibuprofen, Naproxen/Aleve or any NSAID until cleared by surgeon. May take Tylenol instead.  If taking Coumadin, Aspirin, or Plavix, you may resume these medications when cleared by Neurosurgery.    Follow-up as scheduled for a 2 week incision check. Follow-up 6 weeks after surgery with a repeat cervical spine upright x-rays to be completed prior to visit.    **Please notify MD immediately if you experience a fever of 101F, have increased neck or arm pain, new numbness and/or weakness in your arms/hands, difficulty swallowing or breathing especially while lying down, numbness or weakness in your legs.**

## 2024-08-05 ENCOUNTER — TELEPHONE (OUTPATIENT)
Dept: NEUROSURGERY | Facility: CLINIC | Age: 49
End: 2024-08-05

## 2024-08-06 NOTE — TELEPHONE ENCOUNTER
Called patient to see how she is doing after surgery. Patient reports she is doing well overall and denies any incisional issues or fevers. Patient is able to ambulate around the house and complete ADLs. Educated the patient about the importance of preventing blood clots and provided measures how to prevent them.     Patient has moved her bowels since the surgery. Encouraged patient to take an over the counter stool softener, if she is taking narcotic pain medication. Encouraged fiber intake and fluids.    Reviewed incision care with the patient. She has been showing since 3 days post op, using clean wash cloth, towels, and clothing. Do not submerge in water until cleared by the surgeon. Do not apply any creams, ointments, or lotions to the site.  Patient is aware to call the office if any redness, swelling, drainage, dehiscence of incision, or fever >100 F occurs.    Patient is aware to call the office if any concerns or questions may arise. Reminded patient of her upcoming appointments with the date/time/location. Patient was appreciative for the call.

## 2024-08-14 ENCOUNTER — CLINICAL SUPPORT (OUTPATIENT)
Dept: NEUROSURGERY | Facility: CLINIC | Age: 49
End: 2024-08-14

## 2024-08-14 VITALS
HEART RATE: 86 BPM | OXYGEN SATURATION: 98 % | DIASTOLIC BLOOD PRESSURE: 80 MMHG | SYSTOLIC BLOOD PRESSURE: 130 MMHG | TEMPERATURE: 97.8 F

## 2024-08-14 DIAGNOSIS — Z98.890 STATUS POST SURGERY: Primary | ICD-10-CM

## 2024-08-14 PROCEDURE — 99024 POSTOP FOLLOW-UP VISIT: CPT | Performed by: NEUROLOGICAL SURGERY

## 2024-08-14 NOTE — LETTER
August 14, 2024         Patient: Jenny Sepulveda   YOB: 1975         To Whom It May Concern:     Jenny Sepulveda is being treated by this office and may resume activities with the below bulleted  physical restrictions in place until her next  evaluation.  Her next evaluation is  currently scheduled for 9/12/2024.        No heavy lifting greater than 5 - 10 lbs.   No strenuous activities. May walk as tolerated.   Encourage at least 4 short walks per day.   No significant neck movement.     If you have questions, please do not hesitate to call.           Sincerely,      Dr. Endy Aggarwal

## 2024-08-14 NOTE — PROGRESS NOTES
Post-Op Visit- Neurosurgery    Jenny Sepulveda 49 y.o. female MRN: [id number]    Chief Complaint:  Patient presents post: Anterior cervical discectomy and fixation fusion C5-6 and C6-7    History of Present Illness:  Patient presents for 2 week POV for incision check alone and ambulating without an assistive device.  Patient reports she is doing well overall and denies any incisional issues or fevers.  she denies any new weakness, numbness or tingling since the surgery.  Patient has been compliant with wearing the cervical collar and taking the post-operative antibiotic as prescribed.  Patient denies surgical pain at this time and rates their pain as a Pain Score: 0-No pain/10.  Patient is currently taking tylenol and tizanidine with some relief of pain symptoms.      Assessment:   Vitals:    08/14/24 0919   BP: 130/80   Pulse: 86   Temp: 97.8 °F (36.6 °C)   SpO2: 98%       Wound Exam: Incision well approximated.  No erythema, edema or drainage present.   Location: anterior neck.    Procedure:  Steri Strip Removal  Procedure Note: _ steri strips removed. Cleansed area with NSS.  Patient Status: the patient tolerated the procedure well.      Complications: None.       Discussion/Summary:  Doing well postoperatively. Reviewed incision care with patient including daily observation for s/s infection including: increased erythema, edema, drainage, dehiscence of incision or fever >101.  Should these be observed, she understands that she is to call and/or return immediately for reassessment.  Advised patient to continue cleansing area with mild soap and water and pat dry. Not to apply any lotions, creams, or ointments, & not to submerge in any water for 4 more weeks.     She is to maintain activity restrictions until cleared by the surgeon. Activity levels were also reviewed with the patient in detail, she is to lift no greater than 10 pounds and  ambulation is encouraged as tolerated.     Verified date/time/location of upcoming POV and reminded her to complete x-rays prior to her next appointment. She is to call the office with any further questions or concerns, or if any incisional issues or fevers would arise.     Bone Stimulator Rep from [employer] present at end of visit to meet with patient.

## 2024-08-14 NOTE — PATIENT INSTRUCTIONS
Please complete x-rays 2-3 days prior to your next scheduled office visit.     Continue incision care as instructed. Cleans incision area(s) with soap and water and pat dry.  Avoid lotions, creams or ointments for two more weeks.  Avoid soaking in water (bath tubs, hot tubs) for two more weeks.    Maintain activity restrictions until cleared by the surgeon. Avoid heavy lifting and frequent bending/twisting.    Call the office immediately with any signs or symptoms of infection including: increasing redness, swelling, drainage or fevers (101 or greater).

## 2024-09-12 ENCOUNTER — OFFICE VISIT (OUTPATIENT)
Dept: NEUROSURGERY | Facility: CLINIC | Age: 49
End: 2024-09-12

## 2024-09-12 VITALS
OXYGEN SATURATION: 98 % | WEIGHT: 133 LBS | SYSTOLIC BLOOD PRESSURE: 124 MMHG | HEIGHT: 61 IN | BODY MASS INDEX: 25.11 KG/M2 | DIASTOLIC BLOOD PRESSURE: 82 MMHG | TEMPERATURE: 98.1 F | HEART RATE: 81 BPM | RESPIRATION RATE: 18 BRPM

## 2024-09-12 DIAGNOSIS — Z09 POSTOPERATIVE EXAMINATION: Primary | ICD-10-CM

## 2024-09-12 PROCEDURE — 99024 POSTOP FOLLOW-UP VISIT: CPT | Performed by: NEUROLOGICAL SURGERY

## 2024-09-12 NOTE — PROGRESS NOTES
"DISCUSSION SUMMARY   This is a 49 y.o. female Doing well.  Needs PT.  F/U in 3 months         Return in 3 months (on 12/12/2024).      Diagnosis ICD-10-CM Associated Orders   1. Postoperative examination  Z09 Ambulatory referral to Physical Therapy     XR spine cervical complete 4 or 5 vw non injury           Chief Complaint   Patient presents with    Post-op     6 WK POV/ C5-C6 AND C6-7 ACDF           HPI  Doing well overall.  Some numbness but improving    Review of Systems   Constitutional: Negative.    HENT: Negative.     Eyes: Negative.    Respiratory: Negative.     Cardiovascular: Negative.    Gastrointestinal: Negative.    Endocrine: Negative.    Genitourinary: Negative.    Musculoskeletal:  Positive for neck pain (muscle or tendon pain only when moving). Negative for myalgias. Neck stiffness: feels like she always needs to stretch.       Using bone stimulator for 30 minutes daily    Skin: Negative.    Allergic/Immunologic: Negative.    Neurological:  Positive for numbness (in some fingers of both hands at times). Negative for weakness.   Hematological: Negative.    Psychiatric/Behavioral: Negative.  Negative for sleep disturbance.    I reviewed the ROS    Vitals:    /82 (BP Location: Left arm, Patient Position: Sitting, Cuff Size: Adult)   Pulse 81   Temp 98.1 °F (36.7 °C) (Temporal)   Resp 18   Ht 5' 1\" (1.549 m)   Wt 60.3 kg (133 lb)   SpO2 98%   BMI 25.13 kg/m²     MEDICAL HISTORY  Past Medical History:   Diagnosis Date    SVT (supraventricular tachycardia)      Past Surgical History:   Procedure Laterality Date    APPENDECTOMY      BREAST SURGERY      BUNIONECTOMY Bilateral     CARDIAC ELECTROPHYSIOLOGY STUDY AND ABLATION      GANGLION CYST EXCISION      HYSTERECTOMY      LAPAROSCOPY      RI ARTHRD ANT INTERBODY DECOMPRESS CERVICAL BELW C2 N/A 7/31/2024    Procedure: Anterior cervical discectomy and fixation fusion C5-6 and C6-7;  Surgeon: Endy Aggarwal MD;  Location: [facility];  " Service: Neurosurgery     Social History     Tobacco Use    Smoking status: Former     Current packs/day: 0.00     Types: Cigarettes     Quit date:      Years since quittin.7    Smokeless tobacco: Never   Vaping Use    Vaping status: Never Used   Substance Use Topics    Alcohol use: Not Currently    Drug use: Never      History reviewed. No pertinent family history.     Current Outpatient Medications:     acetaminophen (TYLENOL) 500 mg tablet, Take 2 tablets (1,000 mg total) by mouth every 8 (eight) hours (Patient taking differently: Take 1,000 mg by mouth if needed), Disp: , Rfl:     DULoxetine (CYMBALTA) 20 mg capsule, Take 20 mg by mouth daily, Disp: , Rfl:     tiZANidine (ZANAFLEX) 4 mg tablet, Take 4 mg by mouth every 8 (eight) hours as needed (Patient not taking: Reported on 2024), Disp: , Rfl:      Allergies   Allergen Reactions    Epinephrine Palpitations        The following portions of the patient's history were updated by MA and reviewed by MD: allergies, current medications, past family history, past medical history, past social history, past surgical history, and problem list.    Physical Exam  Incision is clean and dry and healing well    RESULTS/DATA  Personally reviewed the following image studies in PACS and associated radiology reports: xray(s). My interpretation of the radiology images/reports is: xray of the cervical spine is carefully reviewed with the patient.  Alignment is maintained.  No signs of breakage or loosening.

## 2024-09-16 ENCOUNTER — NURSE TRIAGE (OUTPATIENT)
Age: 49
End: 2024-09-16

## 2024-09-16 ENCOUNTER — TELEPHONE (OUTPATIENT)
Age: 49
End: 2024-09-16

## 2024-09-16 NOTE — LETTER
Raúl Alvarado,     Attached are your prep instructions for your upcoming colonoscopy on 12/02/2024 . If you have any questions or concerns, please call us at [phone]      Thank you         [facility]          COLONOSCOPY  MIRALAX/Dulcolax Bowel Preparation Instructions    The OR/GI Lab will contact you the evening prior to your procedure with your exact arrival time.    Our practice requires a 1 week notice for any cancellations or rescheduling. We kindly ask that you immediately notify us of any changes including any new medications that are prescribed. Thank you for your cooperation.     WEEK BEFORE YOUR PROCEDURE:  Stop taking Iron tablets.  5 days prior, AVOID vegetables and fruits with skins or seeds, nuts, corn, popcorn and whole grain breads.   Purchase: One (1) 238-gram container of Miralax (polyethylene glycol 3350), four (4) 5 mg Dulcolax (bisacodyl) tablets, and one (1) 64-ounce bottle of Gatorade (sports drink) - no red, orange, or purple. These may be purchased at any pharmacy without a prescription. Generic products are permissible.   Arrange responsible transportation for day of the procedure.     DAY BEFORE THE PROCEDURE:   CLEAR liquids only for entire day prior. Nothing red, orange or purple.    You MAY have:                                                               Soda  Water  Broth Gatorade  Jello  Popsicles Coffee/tea without milk/creamer     YOU MAY NOT HAVE:  Solid foods   Milk and milk products    Juice with pulp    BOWEL PREPARATION:  Includes: One (1) 238-gram container of Miralax (polyethylene glycol 3350), four (4) 5 mg Dulcolax (bisacodyl) tablets, and one (1) 64-ounce bottle of Gatorade (sports drink).  Preparation may be refrigerated.  Entire bowel prep should be completed.     Afternoon before the procedure (2:00 pm - 5:00 pm):    Take two (2) 5 mg Dulcolax laxative tablets.     Evening before the procedure (6:00 pm):  Mix entire  container of Miralax with one (1) 64-ounce bottle of Gatorade and shake until all medication is dissolved.   Begin drinking solution. Drink an eight (8) ounce cup every 10-15 minutes until you have consumed half (32 ounces) of the solution.  Refrigerate remaining solution.    Night before the procedure (8:00 pm):  Take two (2) 5 mg Dulcolax laxative tablets.     Beginning 5 hours before your procedure:  Drink the remaining amount of prepared solution (32 ounces).  Drink an eight (8) ounce cup every 10-15 minutes until you have consumed the remaining solution.     Bowel prep should be completed 4 hours prior to procedure time.    NOTHING TO EAT OR DRINK AFTER MIDNIGHT- EXCEPT FOR YOUR PREP    DAY OF THE PROCEDURE:  You may brush your teeth.  Leave all jewelry at home.  Please arrive for your procedure as indicated by the OR / GI Lab / Endoscopy Unit. The hospital will contact you the day before with your exact arrival time.   Make sure you have arranged ahead of time for a responsible adult (18 or older) to accompany and drive you home after the procedure.  Please discuss any transportation concerns with our staff prior to your procedure.    The effects of the anesthesia can persist for 24 hours.  After receiving the sedation, you must exercise caution before engaging in any activity that could harm yourself and others (such as driving a car).  Do not make any important decisions or do not drink any alcoholic beverages during this time period.  After your procedure, you may have anything you'd like to eat or drink.  You will probably want to start with something light.  Please include plenty of fluids.  Avoid items that cause gas such as sodas and salads.    SPECIAL INSTRUCTIONS:    For patients currently taking blood thinners and/or antiplatelet therapy our office will contact the prescribing provider.  Our office will contact you with any required changes to your medication regimen.     Blood thinner (i.e. -  Coumadin, Pradaxa, Lovenox, Xarelto, Eliquis)  ?  Continue (Do Not Stop)  ? Stop______________for_____________days prior to the procedure.    Antiplatelet (i.e. - Plavix, Aggrenox, Effient, Brilinta)  ?  Continue (Do Not Stop)  ? Stop______________for_____________days prior to the procedure.       Diabetes:   If you are Diabetic, please see separate Diabetic Instruction Sheet.          Prescribed medications:  Do not stop your aspirin, or any of your other medications (unless instructed otherwise).    Take the rest of your prescribed medications with small sips of water at least 2 hours prior to your procedure.      For any questions or concerns related to your bowel preparation or pre-procedure instructions, please contact our office at [phone].  Thank you for choosing [facility]!Medicine Instructions for Adults with Diabetes who Need a Bowel Prep       Follow these instructions when a BOWEL PREP is required for your procedure or surgery!    NOTE:   GLP-1 Agonists taken weekly: do not take in the 7 days before your procedure   SGLT-2 Inhibitors: do not take in the 4 days before your procedure     On the Day Before Surgery/Procedure  If you are having a procedure (e.g. Colonoscopy) or surgery that requires a bowel prep and you may have at least a clear liquid diet, follow the directions below based on the type of medicine you take for your diabetes.     Type of Medicine You Take Examples What to do   Pre-Mixed Insulin - Intermediate Acting Humalog® 75/25, Humulin® 70/30, Novolog® 70/30, Regular Insulin Take ½ your regular dose the evening before your procedure   Rapid/Fast Acting Insulin Humalog® U200, NovoLog®, Apidra®, Fiasp® Take ½ your regular dose the evening before your procedure.   Long-Acting Insulin Lantus®, Levemir®, Tresiba®, Toujeo®, Basaglar® Take your FULL regular dose the day before procedure   Oral Sulfonylurea Glipizide/Glimepiride/Glucotrol® Take ½ your regular dose the  evening before your procedure   Other Oral Diabetes Medicines Metformin®, Glucophage®, Glucophage XR®, Riomet®, Glumetza®), Actose®, Avandia®, Glyset®, Prandin® Take your regular dose with dinner in the evening before your procedure   GLP-1 Agonists AdlyxinÒ, ByettaÒ, BydureonÒ, OzempicÒ, SoliquaÒ, TanzeumÒ, TrulicityÒ, VictozaÒ, Saxenda®, Rybelsus® If taken daily, take as normal    If taken weekly, do not take this medicine for 7 days before your procedure including the day of the procedure (resume taking after the procedure)   SGLT-2 Inhibitors Jardiance®, Invokana®, Farxiga®,   Steglatro®, Brenzavvy®, Qtern®, Segluromet®, Glyxambi®, Synjardy®, Synjardy XR®, Invokamet®, Invokamet XR®, Trijary XR®, Xigduo XR®, Steglujan® Do not take for 4 days before your procedure including the day of the procedure (resume taking after the procedure)                More information continued on back                    Medicine Instructions for Adults with Diabetes who Need a Bowel Prep  Page 2      On the Day of Surgery/Procedure  Follow the directions below based on the type of medicine you take for your diabetes.     Type of Medicine You Take Examples What to do   Long-Acting Insulin Lantus®, Levemir®, Tresiba®, Toujeo®, Basaglar®, Semglee®   If you usually take your Long-Acting Insulin in the morning, take the full dose as scheduled.   GLP-1 Agonists AdlyxinÒ, ByettaÒ, BydureonÒ, OzempicÒ, SoliquaÒ, TanzeumÒ, TrulicityÒ, VictozaÒ, Saxenda®, Rybelsus® Do NOT take this medicine on the day of your procedure (resume taking after the procedure)       On the Day of Surgery/Procedure (continued)  Except for the morning Long-Acting Insulin, DO NOT take ANY diabetic medicine on the day of your procedure unless you were instructed by the doctor who manages your diabetes medicines.    Continue to check your blood sugars.  If you have an insulin pump, ask your endocrinologist for instructions at least 3 days before your procedure. NOTE: If  you are not able to ask your endocrinologist in advance, on the day of the procedure set your insulin pump to your basal rate only. Bring your insulin pump supplies to the hospital.     If you have any questions about taking your diabetes medicines prior to your procedure, please contact the doctor who manages your diabetes medicines.

## 2024-09-16 NOTE — TELEPHONE ENCOUNTER
"Reason for Disposition  • The patient is only passing a small amount of stool    Answer Questions - IBS - CONSTIPATION  When was your last BM: has been going every day but small amount \"size of grape\"     How frequently are you producing a BM: every day     What is the consistency of the BM: hard stools     Do you feel like you have completely evacuated your bowels: no     What medication are you currently taking, dose and frequency:   Miralax- PRN daily     Have you been without a bowel movement > 3 days: no    Protocols used: IBS    "

## 2024-09-16 NOTE — TELEPHONE ENCOUNTER
Scheduled date of colonoscopy (as of today):12/02/2024  Physician performing colonoscopy:Dr Garzon  Location of colonoscopy:Miral[facility]/dul  Bowel prep reviewed with patient:miralax/dul  Instructions reviewed with patient by:sent via my chart   Clearances: n/a

## 2024-09-16 NOTE — TELEPHONE ENCOUNTER
09/16/24  Screened by: Emily Diaz MA    Referring Provider     Pre- Screening:     There is no height or weight on file to calculate BMI.  Has patient been referred for a routine screening Colonoscopy? yes  Is the patient between 45-75 years old? yes      Previous Colonoscopy yes   If yes:    Date: 2019 Dr Garzon    Facility:     Reason:       Does the patient want to see a Gastroenterologist prior to their procedure OR are they having any GI symptoms? no    Has the patient been hospitalized or had abdominal surgery in the past 6 months? no    Does the patient use supplemental oxygen? no    Does the patient take Coumadin, Lovenox, Plavix, Elliquis, Xarelto, or other blood thinning medication? no    Has the patient had a stroke, cardiac event, or stent placed in the past year? no      If patient is between 45yrs - 49yrs, please advise patient that we will have to confirm benefits & coverage with their insurance company for a routine screening colonoscopy.

## 2024-09-16 NOTE — TELEPHONE ENCOUNTER
Regarding: IBS symptoms  ----- Message from Emily MACIAS sent at 9/16/2024  3:56 PM EDT -----  Patient called and stated she has IBS and she has been struggling with stomach issues and would like to know if she is able to take any medication, she did take linzess a while ago please review and reach out thank you

## 2024-09-16 NOTE — TELEPHONE ENCOUNTER
"Pt calling in, reports having constipation and bloating like she is \"8 months pregnant\". Pt reports being diagnosed with IBS -C in the past and stress at work causing flare up.   She reports last full BM was 9/6 but since then only having small \"grape sized\" stools.   She has little nausea and is passing gas . No vomiting    Pt is taking miralax daily and tried colace. She can not take dulcolax due to cramping     I scheduled pt as new pt since she has not been seen in 5 years. ED precautions given and she will try increasing water and miralax.        Reason for Disposition   The patient has had no BM <3 days    Answer Questions - IBS - CONSTIPATION  When was your last BM: has been going every day but small amount \"size of grape\"     How frequently are you producing a BM: every day     What is the consistency of the BM: hard stools     Do you feel like you have completely evacuated your bowels: no     What medication are you currently taking, dose and frequency:   Miralax- PRN daily     Have you been without a bowel movement > 3 days: no    Protocols used: IBS    "

## 2024-11-26 ENCOUNTER — PREP FOR PROCEDURE (OUTPATIENT)
Age: 49
End: 2024-11-26

## 2024-12-02 ENCOUNTER — ANESTHESIA (OUTPATIENT)
Dept: GASTROENTEROLOGY | Facility: HOSPITAL | Age: 49
End: 2024-12-02
Payer: COMMERCIAL

## 2024-12-02 ENCOUNTER — ANESTHESIA EVENT (OUTPATIENT)
Dept: GASTROENTEROLOGY | Facility: HOSPITAL | Age: 49
End: 2024-12-02
Payer: COMMERCIAL

## 2024-12-02 ENCOUNTER — HOSPITAL ENCOUNTER (OUTPATIENT)
Dept: GASTROENTEROLOGY | Facility: HOSPITAL | Age: 49
Setting detail: OUTPATIENT SURGERY
Discharge: HOME | End: 2024-12-02
Attending: INTERNAL MEDICINE
Payer: COMMERCIAL

## 2024-12-02 VITALS
HEIGHT: 60 IN | RESPIRATION RATE: 16 BRPM | OXYGEN SATURATION: 99 % | WEIGHT: 141.09 LBS | HEART RATE: 62 BPM | TEMPERATURE: 98.3 F | BODY MASS INDEX: 27.7 KG/M2 | SYSTOLIC BLOOD PRESSURE: 132 MMHG | DIASTOLIC BLOOD PRESSURE: 74 MMHG

## 2024-12-02 DIAGNOSIS — Z86.0100 HISTORY OF COLON POLYPS: ICD-10-CM

## 2024-12-02 PROCEDURE — 45380 COLONOSCOPY AND BIOPSY: CPT | Performed by: INTERNAL MEDICINE

## 2024-12-02 PROCEDURE — 88305 TISSUE EXAM BY PATHOLOGIST: CPT | Performed by: STUDENT IN AN ORGANIZED HEALTH CARE EDUCATION/TRAINING PROGRAM

## 2024-12-02 RX ORDER — PROPOFOL 10 MG/ML
INJECTION, EMULSION INTRAVENOUS AS NEEDED
Status: DISCONTINUED | OUTPATIENT
Start: 2024-12-02 | End: 2024-12-02

## 2024-12-02 RX ORDER — LIDOCAINE HYDROCHLORIDE 10 MG/ML
0.5 INJECTION, SOLUTION EPIDURAL; INFILTRATION; INTRACAUDAL; PERINEURAL ONCE AS NEEDED
Status: DISCONTINUED | OUTPATIENT
Start: 2024-12-02 | End: 2024-12-06 | Stop reason: HOSPADM

## 2024-12-02 RX ORDER — SODIUM CHLORIDE, SODIUM LACTATE, POTASSIUM CHLORIDE, CALCIUM CHLORIDE 600; 310; 30; 20 MG/100ML; MG/100ML; MG/100ML; MG/100ML
125 INJECTION, SOLUTION INTRAVENOUS CONTINUOUS
Status: DISCONTINUED | OUTPATIENT
Start: 2024-12-02 | End: 2024-12-06 | Stop reason: HOSPADM

## 2024-12-02 RX ORDER — LIDOCAINE HYDROCHLORIDE 20 MG/ML
INJECTION, SOLUTION EPIDURAL; INFILTRATION; INTRACAUDAL; PERINEURAL AS NEEDED
Status: DISCONTINUED | OUTPATIENT
Start: 2024-12-02 | End: 2024-12-02

## 2024-12-02 RX ADMIN — PROPOFOL 20 MG: 10 INJECTION, EMULSION INTRAVENOUS at 09:16

## 2024-12-02 RX ADMIN — SODIUM CHLORIDE, SODIUM LACTATE, POTASSIUM CHLORIDE, AND CALCIUM CHLORIDE 125 ML/HR: .6; .31; .03; .02 INJECTION, SOLUTION INTRAVENOUS at 08:28

## 2024-12-02 RX ADMIN — LIDOCAINE HYDROCHLORIDE 80 MG: 20 INJECTION, SOLUTION EPIDURAL; INFILTRATION; INTRACAUDAL; PERINEURAL at 09:08

## 2024-12-02 RX ADMIN — PROPOFOL 20 MG: 10 INJECTION, EMULSION INTRAVENOUS at 09:10

## 2024-12-02 RX ADMIN — PROPOFOL 20 MG: 10 INJECTION, EMULSION INTRAVENOUS at 09:15

## 2024-12-02 RX ADMIN — PROPOFOL 20 MG: 10 INJECTION, EMULSION INTRAVENOUS at 09:12

## 2024-12-02 RX ADMIN — PROPOFOL 80 MG: 10 INJECTION, EMULSION INTRAVENOUS at 09:08

## 2024-12-02 RX ADMIN — PROPOFOL 20 MG: 10 INJECTION, EMULSION INTRAVENOUS at 09:13

## 2024-12-02 NOTE — DISCHARGE INSTRUCTIONS
Colonoscopy   WHAT YOU NEED TO KNOW:   A colonoscopy is a procedure to examine the inside of your colon (intestine) with a scope. Polyps or tissue growths may have been removed during your colonoscopy. It is normal to feel bloated and to have some abdominal discomfort. You should be passing gas. If you have hemorrhoids or you had polyps removed, you may have a small amount of bleeding.        DISCHARGE INSTRUCTIONS:   Seek care immediately if:   You have sudden, severe abdominal pain.     You have problems swallowing.     You have a large amount of black, sticky bowel movements or blood in your bowel movements.     You have sudden trouble breathing.     You feel weak, lightheaded, or faint or your heart beats faster than normal for you.     Contact your healthcare provider if:   You have a fever and chills.      You have nausea or are vomiting.      Your abdomen is bloated or feels full and hard.     You have abdominal pain.   You have black, sticky bowel movements or blood in your bowel movements.  You have not had a bowel movement for 3 days after your procedure.  You have rash or hives.  You have questions or concerns about your procedure.    Activity:   Do not lift, strain, or run for 24 hours after your procedure.     Rest after your procedure. You have been given medicine to relax you. Do not drive or make important decisions until the day after your procedure. Return to your normal activity as directed.     Relieve gas and discomfort from bloating by lying on your right side with a heating pad on your abdomen. You may need to take short walks to help the gas move out. Eat small meals until bloating is relieved.  Follow up with your healthcare provider as directed: Write down your questions so you remember to ask them during your visits.     If you take a “blood thinner”, please review the specific instructions from your endoscopist about when you should resume it. These can be found in the “Recommendation”  and “Your Medication list” sections of this After Visit Summary..    The patient is given a work excuse note for *** days.

## 2024-12-02 NOTE — INTERVAL H&P NOTE
H&P reviewed. After examining the patient I find no changes in the patients condition since the H&P had been written.    Vitals:    12/02/24 0802   BP: 118/73   Pulse: 78   Resp: 16   Temp: 98.5 °F (36.9 °C)   SpO2: 99%

## 2024-12-02 NOTE — ANESTHESIA PREPROCEDURE EVALUATION
Procedure:  COLONOSCOPY    Hx of SVT - s/p ablation  - Occasional racing palpation, but denies dizziness, SOB or CP.    Relevant Problems   Other   (+) History of colon polyps        Physical Exam    Airway    Mallampati score: III  TM Distance: >3 FB  Neck ROM: full     Dental   No notable dental hx     Cardiovascular  Rhythm: regular, Rate: normal, Cardiovascular exam normal    Pulmonary  Pulmonary exam normal Breath sounds clear to auscultation    Other Findings  post-pubertal.      Anesthesia Plan  ASA Score- 2     Anesthesia Type- IV sedation with anesthesia with ASA Monitors.         Additional Monitors:     Airway Plan:     Comment: Discussed risks/benefits, including medication reactions, awareness, aspiration, and serious/life threatening complications. Plan to maintain native airway with IVGA, monitored with EtCO2.       Plan Factors-Exercise tolerance (METS): >4 METS.    Chart reviewed.    Patient summary reviewed.      Patient instructed to abstain from smoking on day of procedure. Patient did not smoke on day of surgery.            Induction- intravenous.    Postoperative Plan-         Informed Consent- Anesthetic plan and risks discussed with patient.  I personally reviewed this patient with the CRNA. Discussed and agreed on the Anesthesia Plan with the CRNA..

## 2024-12-02 NOTE — ANESTHESIA POSTPROCEDURE EVALUATION
Post-Op Assessment Note    CV Status:  Stable    Pain management: adequate       Mental Status:  Alert and awake   Hydration Status:  Euvolemic   PONV Controlled:  Controlled   Airway Patency:  Patent     Post Op Vitals Reviewed: Yes    No anethesia notable event occurred.    Staff: CRNA           Last Filed PACU Vitals:  Vitals Value Taken Time   Temp 98.3 °F (36.8 °C) 12/02/24 0923   Pulse 74 12/02/24 0923   BP 96/60 12/02/24 0923   Resp 16 12/02/24 0923   SpO2 100 % 12/02/24 0923       Modified Nava:  Activity: 2 (12/2/2024  9:23 AM)  Respiration: 2 (12/2/2024  9:23 AM)  Circulation: 2 (12/2/2024  9:23 AM)  Consciousness: 2 (12/2/2024  9:23 AM)  Oxygen Saturation: 2 (12/2/2024  9:23 AM)  Modified Nava Score: 10 (12/2/2024  9:23 AM)

## 2024-12-02 NOTE — H&P
History and Physical - [facility]  Jenny Sepulveda 49 y.o. female MRN: [id number]                  HPI: Jenny Sepulveda is a 49 y.o. year old female who presents for colonoscopy for history of colon polyps.  Last colonoscopy 5 years ago      REVIEW OF SYSTEMS: Per the HPI, and otherwise unremarkable.    Historical Information   Past Medical History:   Diagnosis Date    SVT (supraventricular tachycardia) (HCC)      Past Surgical History:   Procedure Laterality Date    APPENDECTOMY      BREAST SURGERY      BUNIONECTOMY Bilateral     CARDIAC ELECTROPHYSIOLOGY STUDY AND ABLATION      GANGLION CYST EXCISION      HYSTERECTOMY      LAPAROSCOPY      IL ARTHRD ANT INTERBODY DECOMPRESS CERVICAL BELW C2 N/A 2024    Procedure: Anterior cervical discectomy and fixation fusion C5-6 and C6-7;  Surgeon: Endy Aggarwal MD;  Location: [facility];  Service: Neurosurgery     Social History   Social History     Substance and Sexual Activity   Alcohol Use Not Currently     Social History     Substance and Sexual Activity   Drug Use Never     Social History     Tobacco Use   Smoking Status Former    Current packs/day: 0.00    Types: Cigarettes    Quit date:     Years since quittin.9   Smokeless Tobacco Never     No family history on file.    Meds/Allergies     Not in a hospital admission.    Allergies   Allergen Reactions    Epinephrine Palpitations       Objective     There were no vitals taken for this visit.      PHYSICAL EXAM    Gen: NAD  CV: RRR  CHEST: Clear  ABD: soft, NT/ND  EXT: no edema  Neuro: AAO      ASSESSMENT/PLAN:  This is a 49 y.o. year old female here for history of colon polyps    PLAN:   Procedure: Colonoscopy

## 2024-12-04 PROCEDURE — 88305 TISSUE EXAM BY PATHOLOGIST: CPT | Performed by: STUDENT IN AN ORGANIZED HEALTH CARE EDUCATION/TRAINING PROGRAM

## 2025-02-06 ENCOUNTER — TELEPHONE (OUTPATIENT)
Age: 50
End: 2025-02-06

## 2025-02-06 NOTE — TELEPHONE ENCOUNTER
Called patient to review questions regarding cervical xray. Order is active and she will have imaging completed at [facility] prior to 2/11 visit with SYLVIA. All questions answered at this time, she was appreciated of the call back.

## 2025-02-06 NOTE — TELEPHONE ENCOUNTER
PT CALLED REQUESTING CLARIFICATION ON EXPECTED XR CSPINE ORDERS FOR UPCOMING APPT 2/11/2025 DKO. PER LOV 9/12/2024 DKO, PT WAS INSTRUCTED TO OBTAIN XR CSPINE 4-5 VW BUT THE ORDER IS NOT AVAILABLE FOR RADIOLOGY.    PLEASE UPDATE XR CSPINE FROM 9/12/2024 AND NOTIFY PT SO SHE CAN COMPLETE XR BEFORE 2/11/2025 APPT DKO.      THANK YOU

## 2025-02-08 ENCOUNTER — APPOINTMENT (OUTPATIENT)
Dept: RADIOLOGY | Facility: CLINIC | Age: 50
End: 2025-02-08
Payer: COMMERCIAL

## 2025-02-08 DIAGNOSIS — Z98.890 POST-OPERATIVE STATE: ICD-10-CM

## 2025-02-08 PROCEDURE — 72040 X-RAY EXAM NECK SPINE 2-3 VW: CPT

## 2025-02-11 ENCOUNTER — OFFICE VISIT (OUTPATIENT)
Dept: NEUROSURGERY | Facility: CLINIC | Age: 50
End: 2025-02-11
Payer: COMMERCIAL

## 2025-02-11 VITALS
RESPIRATION RATE: 18 BRPM | HEIGHT: 60 IN | WEIGHT: 141 LBS | TEMPERATURE: 98.1 F | OXYGEN SATURATION: 99 % | DIASTOLIC BLOOD PRESSURE: 70 MMHG | BODY MASS INDEX: 27.68 KG/M2 | HEART RATE: 100 BPM | SYSTOLIC BLOOD PRESSURE: 124 MMHG

## 2025-02-11 DIAGNOSIS — Z98.1 ARTHRODESIS STATUS: Primary | ICD-10-CM

## 2025-02-11 PROCEDURE — 99213 OFFICE O/P EST LOW 20 MIN: CPT | Performed by: NEUROLOGICAL SURGERY

## 2025-02-11 RX ORDER — CEPHALEXIN 500 MG/1
1 CAPSULE ORAL 2 TIMES DAILY
COMMUNITY
Start: 2025-02-06

## 2025-02-11 NOTE — PROGRESS NOTES
Name: Jenny Sepulveda      : 1975      MRN: [id number]  Encounter Provider: Endy Aggarwal MD  Encounter Date: 2025   Encounter department: [facility]  :  Assessment & Plan  Arthrodesis status  49-year-old status post anterior cervical discectomy and fixation fusion at C5-6 and C6-7.  She appears to be healing well.  I anticipate the sensation in her throat to slowly improve.  There is a possibility that this is related to gastroesophageal reflux disease we spoke about treatment strategies for this.  Will plan on seeing her back in the office on an as-needed basis.           History of Present Illness   Overall doing well  Some feelings of lump in her throat but no difficulty swallowing.  No coughing after meals.  Overall she feels much improved  No difficulty with the incision          Review of Systems   Constitutional: Negative.    HENT: Negative.     Eyes: Negative.    Respiratory: Negative.     Cardiovascular: Negative.    Gastrointestinal: Negative.    Endocrine: Negative.    Genitourinary: Negative.    Musculoskeletal:  Positive for myalgias (tightness) and neck stiffness. Negative for gait problem and neck pain.        Feels like something is in her throat    Skin: Negative.    Allergic/Immunologic: Negative.    Neurological:  Positive for numbness (numbness in the pinkys bilateral). Negative for weakness.   Hematological: Negative.    Psychiatric/Behavioral: Negative.      I have personally reviewed the MA's review of systems and made changes as necessary.         Objective   /70 (BP Location: Left arm, Patient Position: Sitting, Cuff Size: Standard)   Pulse 100   Temp 98.1 °F (36.7 °C) (Temporal)   Resp 18   Ht 5' (1.524 m)   Wt 64 kg (141 lb)   SpO2 99%   BMI 27.54 kg/m²     Physical Exam  Constitutional:       Appearance: Normal appearance. She is normal weight.   Musculoskeletal:         General: Normal range of motion.      Cervical  back: Normal range of motion. No rigidity.   Skin:     Comments: Incision is clean and dry and healing well   Neurological:      General: No focal deficit present.      Mental Status: She is alert.      Motor: No weakness (Power is 5 out of 5 bilaterally).      Comments: Her speech is clear and comprehensible       Neurological Exam  Mental Status  Alert.  Her speech is clear and comprehensible.      Radiology Results Review: I personally reviewed the following image studies in PACS and associated radiology reports: xray(s). My interpretation of the radiology images/reports is: X-rays of the cervical spine carefully reviewed.  These demonstrate the instrumentation to be in ideal position without signs of loosening or breakage.

## 2025-02-11 NOTE — LETTER
February 11, 2025     Patient: Jenny Sepulveda  YOB: 1975  Date of Visit: 2/11/2025      To Whom it May Concern:    Jenny Sepulveda is under my professional care. Jenny was seen in my office on 2/11/2025. Jenny may return to work on 2/11/2025 without restriction .    If you have any questions or concerns, please don't hesitate to call.         Sincerely,          Endy Aggarwal MD        CC: No Recipients

## 2025-04-03 ENCOUNTER — EVALUATION (OUTPATIENT)
Dept: PHYSICAL THERAPY | Facility: CLINIC | Age: 50
End: 2025-04-03
Payer: COMMERCIAL

## 2025-04-03 DIAGNOSIS — M54.2 NECK PAIN: Primary | ICD-10-CM

## 2025-04-03 DIAGNOSIS — Z09 POSTOPERATIVE EXAMINATION: ICD-10-CM

## 2025-04-03 DIAGNOSIS — M62.81 MUSCLE WEAKNESS OF UPPER EXTREMITY: ICD-10-CM

## 2025-04-03 PROCEDURE — 97161 PT EVAL LOW COMPLEX 20 MIN: CPT

## 2025-04-03 NOTE — PROGRESS NOTES
PT Evaluation     Today's date: 4/3/2025  Patient name: Jenny Sepulveda  : 1975  MRN: [id number]  Referring provider: Endy Aggarwal,*  Dx:   Encounter Diagnosis     ICD-10-CM    1. Neck pain  M54.2       2. Muscle weakness of upper extremity  M62.81       3. Postoperative examination  Z09 Ambulatory referral to Physical Therapy          Start Time: 1545  Stop Time: 1630  Total time in clinic (min): 45 minutes    Assessment  Impairments: abnormal muscle tone, abnormal or restricted ROM, activity intolerance, impaired physical strength, lacks appropriate home exercise program, pain with function and poor posture     Assessment details: Patient is a 50 y/o female reporting to physical therapy s/p cervical fusion. Upon examination, patient demonstrates impairments of increased pain, decreased ROM, and decreased strength, as well as impaired posture which are resulting in functional limitations of her performance of ADL's/self-care and household chores. PT plan of care will focus on ROM and strengthening to improve her impairments to be able to return to her prior level of function. Patient is a good candidate for and would benefit from skilled physical therapy to improve above listed impairments to facilitate a return to her prior level of function.     Understanding of Dx/Px/POC: good     Prognosis: good    Goals  ST weeks  1. Patient's subjective pain level at worst will decrease by at least 2 levels.  2. Patient will become independent with her HEP.    LT weeks  1. Patient's gross L UE strength will improve to 5/5 for improved tolerance to ADL's and return to gym related activities.  2. Patient's FOTO score will improve by at least 10 points from initial evaluation demonstrating improvements in her performance of functional activities.     Plan  Patient would benefit from: skilled physical therapy and PT eval  Planned modality interventions: low level laser therapy, cryotherapy, thermotherapy:  hydrocollator packs and unattended electrical stimulation    Planned therapy interventions: IASTM, joint mobilization, kinesiology taping, manual therapy, abdominal trunk stabilization, balance/weight bearing training, nerve gliding, neuromuscular re-education, patient/caregiver education, postural training, strengthening, stretching, therapeutic activities, therapeutic exercise, home exercise program, functional ROM exercises and flexibility    Frequency: 2x week  Duration in weeks: 8  Plan of Care beginning date: 4/3/2025  Plan of Care expiration date: 2025  Treatment plan discussed with: patient        Subjective Evaluation    History of Present Illness  Mechanism of injury: Patient is a 50 y/o female presenting to PT s/p cervical fusion performed 24. She notes feeling pretty good since her surgery with greatly improved radicular pain symptoms, but does report problems with increased stiffness and decreased UE strength. Patient states her goals for PT are to be able to improve her ROM and strength to return to gym related activities.           Quality of life: good    Patient Goals  Patient goals for therapy: increased motion, increased strength, independence with ADLs/IADLs, decreased pain and return to sport/leisure activities    Pain  Current pain ratin  At best pain ratin  At worst pain ratin  Quality: tight          Objective     Static Posture     Head  Forward.    Shoulders  Rounded.    Palpation   Left   Hypertonic in the cervical paraspinals and upper trapezius.   Tenderness of the cervical paraspinals and upper trapezius.     Right   Hypertonic in the cervical paraspinals and upper trapezius.   Tenderness of the cervical paraspinals and upper trapezius.     Active Range of Motion   Cervical/Thoracic Spine       Cervical    Flexion: 28 degrees  with pain  Extension: 20 degrees      Left lateral flexion: 30 degrees     with pain  Right lateral flexion: 35 degrees     with pain  Left  "rotation: 50 degrees  Right rotation: 70 degrees       Strength/Myotome Testing     Left Shoulder     Planes of Motion   Flexion: 5   Extension: 4+   Abduction: 4   External rotation at 0°: 4   Internal rotation at 0°: 4+     Right Shoulder     Planes of Motion   Flexion: 5   Extension: 4+   Abduction: 4+   External rotation at 0°: 4   Internal rotation at 0°: 4+     Left Elbow   Flexion: 4+  Extension: 4+    Right Elbow   Flexion: 4+  Extension: 4+    Left Wrist/Hand   Wrist extension: 5  Wrist flexion: 5    Right Wrist/Hand   Wrist extension: 5  Wrist flexion: 5             Precautions: S/p cervical fusion 7/2024    POC expires Unit limit Auth Expiration date PT/OT + Visit Limit?   5/29 N/A N/A BOMN                 Visit/Unit Tracking  Date 4/3                                              FOTO:       Manuals 4/3            LASER b/l UT 5'. 300 cm, 15W, 15 J/cm2            B/l UT TPR TB                                      Neuro Re-Ed             Pt education regarding HEP, POC, and dx 5'             Scap squeezes  X10 3\"                                                                             Ther Ex             No monies X10 3\" grn            Gentle b/l UT stretch X10 5\"                                                                                          Ther Activity                                       Gait Training                                       Modalities                                            "

## 2025-04-07 ENCOUNTER — OFFICE VISIT (OUTPATIENT)
Dept: PHYSICAL THERAPY | Facility: CLINIC | Age: 50
End: 2025-04-07
Payer: COMMERCIAL

## 2025-04-07 DIAGNOSIS — Z09 POSTOPERATIVE EXAMINATION: ICD-10-CM

## 2025-04-07 DIAGNOSIS — M54.2 NECK PAIN: Primary | ICD-10-CM

## 2025-04-07 DIAGNOSIS — M62.81 MUSCLE WEAKNESS OF UPPER EXTREMITY: ICD-10-CM

## 2025-04-07 PROCEDURE — 97110 THERAPEUTIC EXERCISES: CPT

## 2025-04-07 NOTE — PROGRESS NOTES
"Daily Note     Today's date: 2025  Patient name: Jenny Sepulveda  : 1975  MRN: [id number]  Referring provider: Endy Aggarwal,*  Dx:   Encounter Diagnosis     ICD-10-CM    1. Neck pain  M54.2       2. Muscle weakness of upper extremity  M62.81       3. Postoperative examination  Z09           Start Time: 1545  Stop Time: 1630  Total time in clinic (min): 45 minutes    Subjective: Patient reports feeling sore for the first day after her IE. Notes HEP is going well.       Objective: See treatment diary below      Assessment: Tolerated treatment well. Patient demonstrated fatigue post treatment, exhibited good technique with therapeutic exercises, and would benefit from continued PT. Patient provided good effort during performance of exercises. Added UBE, tricep extensions, wall clocks, face pulls, TB ER, TB IR, and forward raises this session to which patient noted increased challenge, but responded well. Visual and verbal cues provided to ensure correct form during performance of new exercises. Will continue to assess patient tolerance and progress next visit, as able.         Plan: Continue per plan of care.      Precautions: S/p cervical fusion 2024    POC expires Unit limit Auth Expiration date PT/OT + Visit Limit?    2 units TE- high co-ins N/A BOMN                 Visit/Unit Tracking  Date 4/3 4/7                                             FOTO:       Manuals 4/3 4/7           LASER b/l UT 5'. 300 cm, 15W, 15 J/cm2            B/l UT TPR TB TB                                     Neuro Re-Ed             Pt education regarding HEP, POC, and dx 5'  5'            Scap squeezes  X10 3\" 2x10 3\"            Prone T's  nv           Prone Y's  nv           TB horizontal abd   nv           Wall clocks  X2 rounds grn TB                         Ther Ex             No monies X10 3\" grn 2x10 3\" grn            Gentle b/l UT stretch X10 5\" HEP           Priya tricep extensions   3x10 12#            Face " pulls on Priya   2x10 10#            Bicep curls   X10 5# x12 8#            TB ER  2x10 red            TB IR   2x10 red            UBE for UE strength/posture  2.5'/2.5           Ther Activity             Forward raise   X15 2#, 15 5#                         Gait Training                                       Modalities

## 2025-04-09 ENCOUNTER — APPOINTMENT (OUTPATIENT)
Dept: PHYSICAL THERAPY | Facility: CLINIC | Age: 50
End: 2025-04-09
Payer: COMMERCIAL

## 2025-04-14 ENCOUNTER — OFFICE VISIT (OUTPATIENT)
Dept: PHYSICAL THERAPY | Facility: CLINIC | Age: 50
End: 2025-04-14
Payer: COMMERCIAL

## 2025-04-14 DIAGNOSIS — M62.81 MUSCLE WEAKNESS OF UPPER EXTREMITY: ICD-10-CM

## 2025-04-14 DIAGNOSIS — M54.2 NECK PAIN: Primary | ICD-10-CM

## 2025-04-14 DIAGNOSIS — Z09 POSTOPERATIVE EXAMINATION: ICD-10-CM

## 2025-04-14 PROCEDURE — 97110 THERAPEUTIC EXERCISES: CPT

## 2025-04-14 NOTE — PROGRESS NOTES
"Daily Note     Today's date: 2025  Patient name: Jenny Sepulveda  : 1975  MRN: [id number]  Referring provider: Endy Aggarwal,*  Dx:   Encounter Diagnosis     ICD-10-CM    1. Neck pain  M54.2       2. Muscle weakness of upper extremity  M62.81       3. Postoperative examination  Z09           Start Time: 1545  Stop Time: 1631  Total time in clinic (min): 46 minutes    Subjective: Patient reports she felt okay after last session.       Objective: See treatment diary below      Assessment: Tolerated treatment well. Patient demonstrated fatigue post treatment, exhibited good technique with therapeutic exercises, and would benefit from continued PT. Continues to provide good effort during performance of exercises. Added prone T's and Y's to which patient noted increased challenge, but responded well. Visual and verbal cues provided to ensure correct form during performance of exercises. Will continue to assess patient tolerance and progress next visit, as able.         Plan: Continue per plan of care.      Precautions: S/p cervical fusion 2024    POC expires Unit limit Auth Expiration date PT/OT + Visit Limit?    2 units TE- high co-ins N/A BOMN                 Visit/Unit Tracking  Date 4/3 4/7 4/14                                            FOTO, IE done :       Manuals 4/3 4/7 4/14          LASER b/l UT 5'. 300 cm, 15W, 15 J/cm2            B/l UT TPR TB TB nv                                    Neuro Re-Ed             Pt education regarding HEP, POC, and dx 5'  5'  5'           Scap squeezes  X10 3\" 2x10 3\"  2x10 3\"           Prone T's  nv x15          Prone Y's  nv x15          TB horizontal abd   nv 2x10 grn           Wall clocks  X2 rounds grn TB  X2 rounds grn TB                        Ther Ex             No monies X10 3\" grn 2x10 3\" grn  2x10 3\" grn           Gentle b/l UT stretch X10 5\" HEP           Leon tricep extensions   3x10 12#  2x10 12#  vv vc          Face pulls on Leon   " 2x10 10#  2x10 10#           Bicep curls   X10 5# x12 8#  2x10 8#           TB ER  2x10 red  2x10 red           TB IR   2x10 red  2x10 red           UBE for UE strength/posture  2.5'/2.5 2.5'/2.5'           Ther Activity             Forward raise   X15 2#, 15 5#  X15 5#           Lateral raise    X15 5#           Gait Training                                       Modalities

## 2025-04-16 ENCOUNTER — APPOINTMENT (OUTPATIENT)
Dept: PHYSICAL THERAPY | Facility: CLINIC | Age: 50
End: 2025-04-16
Payer: COMMERCIAL

## 2025-04-21 ENCOUNTER — OFFICE VISIT (OUTPATIENT)
Dept: PHYSICAL THERAPY | Facility: CLINIC | Age: 50
End: 2025-04-21
Attending: NEUROLOGICAL SURGERY
Payer: COMMERCIAL

## 2025-04-21 DIAGNOSIS — M54.2 NECK PAIN: Primary | ICD-10-CM

## 2025-04-21 DIAGNOSIS — Z09 POSTOPERATIVE EXAMINATION: ICD-10-CM

## 2025-04-21 DIAGNOSIS — M62.81 MUSCLE WEAKNESS OF UPPER EXTREMITY: ICD-10-CM

## 2025-04-21 PROCEDURE — 97140 MANUAL THERAPY 1/> REGIONS: CPT

## 2025-04-21 PROCEDURE — 97112 NEUROMUSCULAR REEDUCATION: CPT

## 2025-04-21 PROCEDURE — 97110 THERAPEUTIC EXERCISES: CPT

## 2025-04-21 NOTE — PROGRESS NOTES
"Daily Note     Today's date: 2025  Patient name: Jenny Sepulveda  : 1975  MRN: [id number]  Referring provider: Endy Aggarwal,*  Dx:   Encounter Diagnosis     ICD-10-CM    1. Neck pain  M54.2       2. Muscle weakness of upper extremity  M62.81       3. Postoperative examination  Z09           Start Time: 1545  Stop Time: 1630  Total time in clinic (min): 45 minutes    Subjective: Patient reports 0/10 cervical pain, but stiffness      Objective: See treatment diary below      Assessment: Tolerated treatment well.   Patient participated in skilled PT session focused on strengthening, stretching, and ROM. Patient able to complete exercise program with some relief in cervical/UT tightness.  Patient continues to demonstrate increased tightness in B/L UT L>R.  Patient needs verbal and visual cues for proper technique and relaxing the shoulder prior to performing exercises.   Patient would continue to benefit from skilled PT interventions to address strengthening, stretching, and ROM. Patient demonstrated fatigue post treatment      Plan: Continue per plan of care.      Precautions: S/p cervical fusion 2024    POC expires Unit limit Auth Expiration date PT/OT + Visit Limit?    2 units TE- high co-ins N/A BOMN                 Visit/Unit Tracking  Date 4/3 4/7 4/14 4/21                                           FOTO, IE done :       Manuals 4/3 4/7 4/14 4/21         LASER b/l UT 5'. 300 cm, 15W, 15 J/cm2            B/l UT TPR TB TB nv CD                                   Neuro Re-Ed             Pt education regarding HEP, POC, and dx 5'  5'  5'           Scap squeezes  X10 3\" 2x10 3\"  2x10 3\"  5\" 20x         Prone T's  nv x15 15x         Prone Y's  nv x15 15x         TB horizontal abd   nv 2x10 grn  Grn  2x10         Wall clocks  X2 rounds grn TB  X2 rounds grn TB  2x rounds Grn TB                       Ther Ex             No monies X10 3\" grn 2x10 3\" grn  2x10 3\" grn  3\" 2x10  Grn         Gentle " "b/l UT stretch X10 5\" HEP           Priya tricep extensions   3x10 12#  2x10 12#  vv vc 12# 2x10          Face pulls on Priya   2x10 10#  2x10 10#  10# 2x10         Bicep curls   X10 5# x12 8#  2x10 8#  8# 2x10         TB ER  2x10 red  2x10 red  RTB  2x10         TB IR   2x10 red  2x10 red  RTB 2x10         UBE for UE strength/posture  2.5'/2.5 2.5'/2.5'  3'/3'.         Ther Activity             Forward raise   X15 2#, 15 5#  X15 5#  5# B/L  15x         Lateral raise    X15 5#  5# B/L 15x         Gait Training                                       Modalities                                                "

## 2025-04-23 ENCOUNTER — APPOINTMENT (OUTPATIENT)
Dept: PHYSICAL THERAPY | Facility: CLINIC | Age: 50
End: 2025-04-23
Payer: COMMERCIAL

## 2025-04-28 ENCOUNTER — OFFICE VISIT (OUTPATIENT)
Dept: PHYSICAL THERAPY | Facility: CLINIC | Age: 50
End: 2025-04-28
Payer: COMMERCIAL

## 2025-04-28 DIAGNOSIS — M54.2 NECK PAIN: Primary | ICD-10-CM

## 2025-04-28 DIAGNOSIS — Z09 POSTOPERATIVE EXAMINATION: ICD-10-CM

## 2025-04-28 DIAGNOSIS — M62.81 MUSCLE WEAKNESS OF UPPER EXTREMITY: ICD-10-CM

## 2025-04-28 PROCEDURE — 97110 THERAPEUTIC EXERCISES: CPT

## 2025-04-28 PROCEDURE — 97112 NEUROMUSCULAR REEDUCATION: CPT

## 2025-04-28 NOTE — PROGRESS NOTES
"Daily Note     Today's date: 2025  Patient name: Jenny Sepulveda  : 1975  MRN: [id number]  Referring provider: Endy Aggarwal,*  Dx:   Encounter Diagnosis     ICD-10-CM    1. Neck pain  M54.2       2. Muscle weakness of upper extremity  M62.81       3. Postoperative examination  Z09                      Subjective: Patient states she just feel stiff.       Objective: See treatment diary below      Assessment: Tolerated treatment well. Min cuing to facilitate proper performance. She is challenged by reps with ER/IR. Increased resistance with tricep ext. Patient demonstrated fatigue post treatment and would benefit from continued PT      Plan: Progress treatment as tolerated.       Precautions: S/p cervical fusion 2024    POC expires Unit limit Auth Expiration date PT/OT + Visit Limit?    2 units TE- high co-ins N/A BOMN                 Visit/Unit Tracking  Date 4/3 4/7 4/14 4/21 4/28                                          FOTO, IE done :       Manuals 4/3 4/7 4/14 4/21 4/28        LASER b/l UT 5'. 300 cm, 15W, 15 J/cm2            B/l UT TPR TB TB nv CD nv                                  Neuro Re-Ed             Pt education regarding HEP, POC, and dx 5'  5'  5'           Scap squeezes  X10 3\" 2x10 3\"  2x10 3\"  5\" 20x 5\" 20x         Prone T's  nv x15 15x 15x        Prone Y's  nv x15 15x 15x        TB horizontal abd   nv 2x10 grn  Grn  2x10 Grn 2x10         Wall clocks  X2 rounds grn TB  X2 rounds grn TB  2x rounds Grn TB  2x round grn TB                      Ther Ex             No monies X10 3\" grn 2x10 3\" grn  2x10 3\" grn  3\" 2x10  Grn 3\" 2x10 grn        Gentle b/l UT stretch X10 5\" HEP           [place] tricep extensions   3x10 12#  2x10 12#  vv vc 12# 2x10  15#  2x10         Face pulls on [place]   2x10 10#  2x10 10#  10# 2x10 10# 2x15 Increase #        Bicep curls   X10 5# x12 8#  2x10 8#  8# 2x10 8# 2x10        TB ER  2x10 red  2x10 red  RTB  2x10 RTB 2x10         TB IR   2x10 red  " 2x10 red  RTB 2x10 RTB 2x10        UBE for UE strength/posture  2.5'/2.5 2.5'/2.5'  3'/3'. 3'/3'        Ther Activity             Forward raise   X15 2#, 15 5#  X15 5#  5# B/L  15x 5# B/L  15x        Lateral raise    X15 5#  5# B/L 15x 5# B/L 15x        Gait Training                                       Modalities

## 2025-04-30 ENCOUNTER — APPOINTMENT (OUTPATIENT)
Dept: PHYSICAL THERAPY | Facility: CLINIC | Age: 50
End: 2025-04-30
Payer: COMMERCIAL

## 2025-05-05 ENCOUNTER — OFFICE VISIT (OUTPATIENT)
Dept: PHYSICAL THERAPY | Facility: CLINIC | Age: 50
End: 2025-05-05
Payer: COMMERCIAL

## 2025-05-05 DIAGNOSIS — Z09 POSTOPERATIVE EXAMINATION: ICD-10-CM

## 2025-05-05 DIAGNOSIS — M54.2 NECK PAIN: Primary | ICD-10-CM

## 2025-05-05 DIAGNOSIS — M62.81 MUSCLE WEAKNESS OF UPPER EXTREMITY: ICD-10-CM

## 2025-05-05 PROCEDURE — 97110 THERAPEUTIC EXERCISES: CPT

## 2025-05-05 PROCEDURE — 97112 NEUROMUSCULAR REEDUCATION: CPT

## 2025-05-05 NOTE — PROGRESS NOTES
"Daily Note     Today's date: 2025  Patient name: Jenny Sepulveda  : 1975  MRN: [id number]  Referring provider: Endy Aggarwal,*  Dx:   Encounter Diagnosis     ICD-10-CM    1. Neck pain  M54.2       2. Muscle weakness of upper extremity  M62.81       3. Postoperative examination  Z09           Start Time: 1545  Stop Time: 1630  Total time in clinic (min): 45 minutes    Subjective: Patient reports she feels she is getting stronger, but she is still weak.       Objective: See treatment diary below      Assessment: Tolerated treatment well. Patient demonstrated fatigue post treatment, exhibited good technique with therapeutic exercises, and would benefit from continued PT. Continues to provide good effort during performance of exercises. Tolerated progressions of resistance during therapeutic exercises well today. Will continue to assess patient tolerance and progress next visit, as able.         Plan: Continue per plan of care.      Precautions: S/p cervical fusion 2024    POC expires Unit limit Auth Expiration date PT/OT + Visit Limit?    2 units TE- high co-ins N/A BOMN                 Visit/Unit Tracking  Date 4/3 4/7 4/14 4/21 4/28 5                                         FOTO, IE done :       Manuals 4/3 4/7 4/14 4/21 4/28 5/5       LASER b/l UT 5'. 300 cm, 15W, 15 J/cm2            B/l UT TPR TB TB nv CD nv                                  Neuro Re-Ed             Pt education regarding HEP, POC, and dx 5'  5'  5'           Scap squeezes  X10 3\" 2x10 3\"  2x10 3\"  5\" 20x 5\" 20x         Prone T's  nv x15 15x 15x 15x       Prone Y's  nv x15 15x 15x 15x       TB horizontal abd   nv 2x10 grn  Grn  2x10 Grn 2x10  Blue 2x10        Wall clocks  X2 rounds grn TB  X2 rounds grn TB  2x rounds Grn TB  2x round grn TB  2x round grn TB                     Ther Ex             No monies X10 3\" grn 2x10 3\" grn  2x10 3\" grn  3\" 2x10  Grn 3\" 2x10 grn 3\" 2x10  blue        Gentle b/l UT stretch X10 5\" HEP  "          Priya tricep extensions   3x10 12#  2x10 12#  vv vc 12# 2x10  15#  2x10  15# 2x10        Face pulls on [place]   2x10 10#  2x10 10#  10# 2x10 10# 2x15 12# 2x15        Bicep curls   X10 5# x12 8#  2x10 8#  8# 2x10 8# 2x10 8# 2x10        TB ER  2x10 red  2x10 red  RTB  2x10 RTB 2x10  RTB 2x10        TB IR   2x10 red  2x10 red  RTB 2x10 RTB 2x10 RTB 2x10        UBE for UE strength/posture  2.5'/2.5 2.5'/2.5'  3'/3'. 3'/3' 3'/3'       Ther Activity             Forward raise   X15 2#, 15 5#  X15 5#  5# B/L  15x 5# B/L  15x 5# B/L  15x       Lateral raise    X15 5#  5# B/L 15x 5# B/L 15x 5# B/L  15x       Gait Training                                       Modalities

## 2025-05-07 ENCOUNTER — APPOINTMENT (OUTPATIENT)
Dept: PHYSICAL THERAPY | Facility: CLINIC | Age: 50
End: 2025-05-07
Payer: COMMERCIAL

## 2025-05-12 ENCOUNTER — OFFICE VISIT (OUTPATIENT)
Dept: PHYSICAL THERAPY | Facility: CLINIC | Age: 50
End: 2025-05-12
Attending: NEUROLOGICAL SURGERY
Payer: COMMERCIAL

## 2025-05-12 DIAGNOSIS — M54.2 NECK PAIN: Primary | ICD-10-CM

## 2025-05-12 DIAGNOSIS — M62.81 MUSCLE WEAKNESS OF UPPER EXTREMITY: ICD-10-CM

## 2025-05-12 DIAGNOSIS — Z09 POSTOPERATIVE EXAMINATION: ICD-10-CM

## 2025-05-12 PROCEDURE — 97112 NEUROMUSCULAR REEDUCATION: CPT

## 2025-05-12 PROCEDURE — 97110 THERAPEUTIC EXERCISES: CPT

## 2025-05-12 NOTE — PROGRESS NOTES
"PT Discharge     Today's date: 2025  Patient name: Jenny Sepulveda  : 1975  MRN: [id number]  Referring provider: Endy Aggarwal,*  Dx:   Encounter Diagnosis     ICD-10-CM    1. Neck pain  M54.2       2. Muscle weakness of upper extremity  M62.81       3. Postoperative examination  Z09           Start Time: 1545  Stop Time: 1631  Total time in clinic (min): 46 minutes    Subjective: Patient reports feeling improved in strength since beginning therapy and is ready to transition to a HEP at this time.       Objective: See treatment diary below      Assessment: Tolerated treatment well. Patient demonstrated fatigue post treatment and exhibited good technique with therapeutic exercises. Continues to provide good effort during performance of exercises. Patient demonstrates fatigue this session from the middle of the session until the end. Patient demonstrates challenge, but no significantly increased symptoms during performance of exercises. Patient to be discharged from PT at this time secondary to improved symptoms and strength. Patient was provided updated HEP at end of session.         Plan: Patient to be discharged at this time.      Precautions: S/p cervical fusion 2024    POC expires Unit limit Auth Expiration date PT/OT + Visit Limit?    2 units TE- high co-ins N/A BOMN                 Visit/Unit Tracking  Date 4/3 4/7 4/14 4/21 4/28 5/5 5/12                                        FOTO, MARGARITA done :       Manuals 4/3 4/7 4/14 4/21 4/28 5/5 5/12      LASER b/l UT 5'. 300 cm, 15W, 15 J/cm2            B/l UT TPR TB TB nv CD nv                                  Neuro Re-Ed             Pt education regarding HEP, POC, and dx 5'  5'  5'           Scap squeezes  X10 3\" 2x10 3\"  2x10 3\"  5\" 20x 5\" 20x         Prone T's  nv x15 15x 15x 15x 15x      Prone Y's  nv x15 15x 15x 15x 15x      TB horizontal abd   nv 2x10 grn  Grn  2x10 Grn 2x10  Blue 2x10  Blue 3x10       Wall clocks  X2 rounds grn TB  X2 " "rounds grn TB  2x rounds Grn TB  2x round grn TB  2x round grn TB  2x round grn TB                    Ther Ex             No monies X10 3\" grn 2x10 3\" grn  2x10 3\" grn  3\" 2x10  Grn 3\" 2x10 grn 3\" 2x10  blue  3\" 2x10 blue       Gentle b/l UT stretch X10 5\" HEP           Priya tricep extensions   3x10 12#  2x10 12#  vv vc 12# 2x10  15#  2x10  15# 2x10  15# 2x10       Face pulls on [place]   2x10 10#  2x10 10#  10# 2x10 10# 2x15 12# 2x15  12# 2x15       Bicep curls   X10 5# x12 8#  2x10 8#  8# 2x10 8# 2x10 8# 2x10  10# 3x10       TB ER  2x10 red  2x10 red  RTB  2x10 RTB 2x10  RTB 2x10  RTB 2x10      TB IR   2x10 red  2x10 red  RTB 2x10 RTB 2x10 RTB 2x10  RTB 2x10       UBE for UE strength/posture  2.5'/2.5 2.5'/2.5'  3'/3'. 3'/3' 3'/3' 3'/3'      Ther Activity             Forward raise   X15 2#, 15 5#  X15 5#  5# B/L  15x 5# B/L  15x 5# B/L  15x 5# b/l 15x      Lateral raise    X15 5#  5# B/L 15x 5# B/L 15x 5# B/L  15x 5# B/L  15x      Gait Training                                       Modalities                                                      "

## 2025-05-14 ENCOUNTER — APPOINTMENT (OUTPATIENT)
Dept: PHYSICAL THERAPY | Facility: CLINIC | Age: 50
End: 2025-05-14
Payer: COMMERCIAL

## 2025-05-19 ENCOUNTER — APPOINTMENT (OUTPATIENT)
Dept: PHYSICAL THERAPY | Facility: CLINIC | Age: 50
End: 2025-05-19
Payer: COMMERCIAL

## 2025-05-21 ENCOUNTER — APPOINTMENT (OUTPATIENT)
Dept: PHYSICAL THERAPY | Facility: CLINIC | Age: 50
End: 2025-05-21
Payer: COMMERCIAL

## 2025-08-01 ENCOUNTER — OFFICE VISIT (OUTPATIENT)
Dept: INTERNAL MEDICINE CLINIC | Facility: CLINIC | Age: 50
End: 2025-08-01
Payer: COMMERCIAL

## 2025-08-01 VITALS
HEIGHT: 60 IN | OXYGEN SATURATION: 97 % | WEIGHT: 137 LBS | SYSTOLIC BLOOD PRESSURE: 134 MMHG | BODY MASS INDEX: 26.9 KG/M2 | DIASTOLIC BLOOD PRESSURE: 86 MMHG | HEART RATE: 86 BPM | RESPIRATION RATE: 17 BRPM

## 2025-08-01 DIAGNOSIS — K58.1 IRRITABLE BOWEL SYNDROME WITH CONSTIPATION: ICD-10-CM

## 2025-08-01 DIAGNOSIS — Z13.1 DIABETES MELLITUS SCREENING: ICD-10-CM

## 2025-08-01 DIAGNOSIS — R79.89 ABNORMAL CBC: ICD-10-CM

## 2025-08-01 DIAGNOSIS — E66.3 OVERWEIGHT: ICD-10-CM

## 2025-08-01 DIAGNOSIS — F41.9 ANXIETY AND DEPRESSION: ICD-10-CM

## 2025-08-01 DIAGNOSIS — F32.A ANXIETY AND DEPRESSION: ICD-10-CM

## 2025-08-01 DIAGNOSIS — E78.2 MODERATE MIXED HYPERLIPIDEMIA NOT REQUIRING STATIN THERAPY: ICD-10-CM

## 2025-08-01 DIAGNOSIS — Z00.00 ENCOUNTER FOR MEDICAL EXAMINATION TO ESTABLISH CARE: Primary | ICD-10-CM

## 2025-08-01 PROBLEM — E78.5 HYPERLIPIDEMIA: Status: ACTIVE | Noted: 2017-11-13

## 2025-08-01 PROBLEM — F43.22 ADJUSTMENT DISORDER WITH ANXIOUS MOOD: Status: ACTIVE | Noted: 2017-11-13

## 2025-08-01 PROBLEM — L70.0 ACNE VULGARIS: Status: ACTIVE | Noted: 2019-01-23

## 2025-08-01 PROBLEM — Z90.710 HISTORY OF HYSTERECTOMY: Status: ACTIVE | Noted: 2023-11-15

## 2025-08-01 PROCEDURE — 99203 OFFICE O/P NEW LOW 30 MIN: CPT | Performed by: INTERNAL MEDICINE

## 2025-08-01 RX ORDER — PHENTERMINE HYDROCHLORIDE 37.5 MG/1
37.5 TABLET ORAL
COMMUNITY
Start: 2025-05-01 | End: 2025-08-05 | Stop reason: SDUPTHER

## 2025-08-04 ENCOUNTER — PATIENT MESSAGE (OUTPATIENT)
Dept: INTERNAL MEDICINE CLINIC | Facility: CLINIC | Age: 50
End: 2025-08-04

## 2025-08-04 DIAGNOSIS — E66.3 OVERWEIGHT: Primary | ICD-10-CM

## 2025-08-05 RX ORDER — PHENTERMINE HYDROCHLORIDE 37.5 MG/1
37.5 TABLET ORAL
Qty: 30 TABLET | Refills: 1 | Status: SHIPPED | OUTPATIENT
Start: 2025-08-05

## (undated) DEVICE — DRILL BIT 7080510 11 MM DRILL BIT S

## (undated) DEVICE — ELECTRODE BLADE MOD E-Z CLEAN 2.5IN 6.4CM -0012M

## (undated) DEVICE — SUT SILK 2-0 18 IN A185H

## (undated) DEVICE — LIGHT HANDLE COVER SLEEVE DISP BLUE STELLAR

## (undated) DEVICE — INTENDED FOR TISSUE SEPARATION, AND OTHER PROCEDURES THAT REQUIRE A SHARP SURGICAL BLADE TO PUNCTURE OR CUT.: Brand: BARD-PARKER ® CARBON RIB-BACK BLADES

## (undated) DEVICE — GLOVE INDICATOR PI UNDERGLOVE SZ 8.5 BLUE

## (undated) DEVICE — DRAPE SURGIKIT SADDLE BAG

## (undated) DEVICE — 3M™ STERI-STRIP™ REINFORCED ADHESIVE SKIN CLOSURES, R1547, 1/2 IN X 4 IN (12 MM X 100 MM), 6 STRIPS/ENVELOPE: Brand: 3M™ STERI-STRIP™

## (undated) DEVICE — ROSEBUD DISSECTORS: Brand: DEROYAL

## (undated) DEVICE — SNAP KOVER: Brand: UNBRANDED

## (undated) DEVICE — PLASTIC ADHESIVE BANDAGE: Brand: CURITY

## (undated) DEVICE — BETHLEHEM UNIVERSAL SPINE, KIT: Brand: CARDINAL HEALTH

## (undated) DEVICE — INTENDED FOR TISSUE SEPARATION, AND OTHER PROCEDURES THAT REQUIRE A SHARP SURGICAL BLADE TO PUNCTURE OR CUT.: Brand: BARD-PARKER SAFETY BLADES SIZE 15, STERILE

## (undated) DEVICE — SUT VICRYL PLUS 3-0 RB-1 CR/8 18 IN VCP713D

## (undated) DEVICE — DRAPE MICROSCOPE OPMI PENTERO

## (undated) DEVICE — TOOL MR8-9CY50CS MR8 9CM CYL 5MM CRNRSTN: Brand: MIDAS REX MR8

## (undated) DEVICE — SKN PRP WNG SPNGE PVP SCRB STR: Brand: MEDLINE INDUSTRIES, INC.

## (undated) DEVICE — GRAFTON DBM DBF 1ML: Type: IMPLANTABLE DEVICE | Site: SPINE CERVICAL | Status: NON-FUNCTIONAL

## (undated) DEVICE — TIBURON SPLIT SHEET: Brand: CONVERTORS

## (undated) DEVICE — HEMOSTATIC MATRIX SURGIFLO 8ML W/THROMBIN

## (undated) DEVICE — DRAPE SHEET X-LG

## (undated) DEVICE — TOOL MR8-14MH30 MR8 14CM MATCH 3MM: Brand: MIDAS REX MR8

## (undated) DEVICE — PENCIL ELECTROSURG E-Z CLEAN -0035H

## (undated) DEVICE — SWABSTCK, BENZOIN TINCTURE, 1/PK, STRL: Brand: APLICARE

## (undated) DEVICE — GLOVE SRG BIOGEL 8

## (undated) DEVICE — PREP SURGICAL PURPREP 26ML